# Patient Record
Sex: MALE | Race: WHITE | Employment: OTHER | URBAN - NONMETROPOLITAN AREA
[De-identification: names, ages, dates, MRNs, and addresses within clinical notes are randomized per-mention and may not be internally consistent; named-entity substitution may affect disease eponyms.]

---

## 2018-08-13 ENCOUNTER — TELEPHONE (OUTPATIENT)
Dept: PHYSICAL MEDICINE AND REHAB | Age: 83
End: 2018-08-13

## 2018-08-13 ENCOUNTER — OFFICE VISIT (OUTPATIENT)
Dept: PHYSICAL MEDICINE AND REHAB | Age: 83
End: 2018-08-13
Payer: MEDICARE

## 2018-08-13 VITALS
HEART RATE: 75 BPM | DIASTOLIC BLOOD PRESSURE: 74 MMHG | HEIGHT: 70 IN | SYSTOLIC BLOOD PRESSURE: 114 MMHG | BODY MASS INDEX: 27.77 KG/M2 | WEIGHT: 194 LBS

## 2018-08-13 DIAGNOSIS — G89.4 CHRONIC PAIN SYNDROME: ICD-10-CM

## 2018-08-13 DIAGNOSIS — M48.061 LUMBAR STENOSIS WITHOUT NEUROGENIC CLAUDICATION: ICD-10-CM

## 2018-08-13 DIAGNOSIS — M47.816 SPONDYLOSIS OF LUMBAR REGION WITHOUT MYELOPATHY OR RADICULOPATHY: Primary | ICD-10-CM

## 2018-08-13 DIAGNOSIS — M47.816 LUMBAR FACET ARTHROPATHY: ICD-10-CM

## 2018-08-13 DIAGNOSIS — M51.36 LUMBAR DEGENERATIVE DISC DISEASE: ICD-10-CM

## 2018-08-13 PROCEDURE — 4040F PNEUMOC VAC/ADMIN/RCVD: CPT | Performed by: NURSE PRACTITIONER

## 2018-08-13 PROCEDURE — 1101F PT FALLS ASSESS-DOCD LE1/YR: CPT | Performed by: NURSE PRACTITIONER

## 2018-08-13 PROCEDURE — 99205 OFFICE O/P NEW HI 60 MIN: CPT | Performed by: NURSE PRACTITIONER

## 2018-08-13 PROCEDURE — 1036F TOBACCO NON-USER: CPT | Performed by: NURSE PRACTITIONER

## 2018-08-13 PROCEDURE — G8419 CALC BMI OUT NRM PARAM NOF/U: HCPCS | Performed by: NURSE PRACTITIONER

## 2018-08-13 PROCEDURE — 1123F ACP DISCUSS/DSCN MKR DOCD: CPT | Performed by: NURSE PRACTITIONER

## 2018-08-13 PROCEDURE — G8427 DOCREV CUR MEDS BY ELIG CLIN: HCPCS | Performed by: NURSE PRACTITIONER

## 2018-08-13 RX ORDER — APIXABAN 5 MG/1
TABLET, FILM COATED ORAL
Status: ON HOLD | COMMUNITY
End: 2018-12-02 | Stop reason: HOSPADM

## 2018-08-13 RX ORDER — AMOXICILLIN AND CLAVULANATE POTASSIUM 875; 125 MG/1; MG/1
TABLET, FILM COATED ORAL
Status: ON HOLD | COMMUNITY
End: 2018-08-23 | Stop reason: ALTCHOICE

## 2018-08-13 RX ORDER — ATORVASTATIN CALCIUM 40 MG/1
TABLET, FILM COATED ORAL
COMMUNITY

## 2018-08-13 RX ORDER — LISINOPRIL 10 MG/1
TABLET ORAL
COMMUNITY

## 2018-08-13 RX ORDER — DILTIAZEM HYDROCHLORIDE 60 MG/1
TABLET, FILM COATED ORAL
Status: ON HOLD | COMMUNITY
End: 2018-12-02 | Stop reason: HOSPADM

## 2018-08-13 RX ORDER — WARFARIN SODIUM 7.5 MG/1
TABLET ORAL
Status: ON HOLD | COMMUNITY
End: 2018-08-23 | Stop reason: ALTCHOICE

## 2018-08-13 RX ORDER — BICALUTAMIDE 50 MG/1
50 TABLET, FILM COATED ORAL
COMMUNITY
Start: 2018-05-16

## 2018-08-13 ASSESSMENT — ENCOUNTER SYMPTOMS
SHORTNESS OF BREATH: 1
GASTROINTESTINAL NEGATIVE: 1
BACK PAIN: 1
ALLERGIC/IMMUNOLOGIC NEGATIVE: 1

## 2018-08-13 NOTE — PROGRESS NOTES
[facility]  [address] [facility].  Dept: [phone]  Dept Fax: [phone]: [phone]    Visit Date: 8/13/2018    Radha Hernandes is a 80 y.o. male who is referred for pain management evaluation and treatment per Dr. Gerhardt Daniels. CAGE and [employer]AGE[employer] Questions   1. In the last three months, have you felt you should cut down or stop drinking or using drugs? Yes []        No [x]     2. In the last three months, has anyone annoyed you or gotten on your nerves by telling you to cut down or stop drinking or using drugs? Yes []        No [x]     3. In the last three months, have you felt guilty or bad about how much you drink or use drugs? Yes []        No [x]     4. In the last three months, have you been waking up wanting to have an alcoholic drink or use drugs? Yes []        No [x]        Opioid Risk Tool:  Clinician Form       1. Family History of Substance Abuse: Female Male    Alcohol   []1   []3    Illegal drugs   []2   []3    Prescription drugs     []4   []4   2. Personal History of Substance Abuse:          Alcohol   []3   []3    Illegal drugs   []4   []4    Prescription drugs     []5   []5   3. Age (jostin box if between 12 and 39):     []1   []1   4. History of Preadolescent Sexual Abuse:     []3   []0   5. Psychological Disease:      Attention deficit disorder, obsessive-compulsive disorder, bipolar, schizophrenia   []2   []2      Depression     []1   []1    Scoring Totals       Total Score  Low Risk  Moderate Risk  High Risk   Risk Category   0 - 3   4 - 7   8 or Above      Patient states symptoms interfere with:  A.  General Activity:  yes   B. Mood: no    C. Walking Ability:   yes   D. Normal Work (Includes both work outside the home and housework):   yes    E.  Relations with Other People:  no   F. Sleep:   yes   G.  Enjoyment of Life:  no       HPI:     Chief Complaint: Lower back pain      HPI  Lower back pain present for the past 5 years, denies no injury or trauma. Pin has gradually increased in the past 5 years. Pain is located in lower back and radiates across lower back. Denies any radicular pain or radiation to other areas. Pain is currently 3/10 while sitting. Increases up to 8-9/10 aggravated bending, chores, lifting, turning. Pain decreases to as low as 2/10 with sitting and resting. Pain is described as achy pain with sharp pains with activity. Pain is worse in the morning with stiffness. Pain interferes with activity, chores, social life. Able to tolerate 30 minutes of activity before he needs a break. Denies any bowel or bladder changes or loss of function. Patient remains very active. Has tried therapy and continues home exercises which helps, Ibuprofen has helped but not a candidate d/t being on blood thinners. Not a candidate for back surgery was seen and evaluated by [facility] and referred here. L-MRI reviewed     The patient has No Known Allergies. Past Medical History  Alek Murillo  has no past medical history on file. Past Surgical History  The patient  has no past surgical history on file. Family History  This patient's family history is not on file. Social History  Alek Murillo  reports that he has never smoked. He has never used smokeless tobacco. He reports that he does not drink alcohol or use drugs.     Medications    Current Outpatient Prescriptions:     Multiple Vitamins-Minerals (MULTIVITAMIN ADULT PO), Take by mouth, Disp: , Rfl:     apixaban (ELIQUIS) 5 MG TABS tablet, Eliquis 5 mg tablet, Disp: , Rfl:     Mirabegron ER (MYRBETRIQ) 25 MG TB24, Myrbetriq 25 mg tablet,extended release, Disp: , Rfl:     atorvastatin (LIPITOR) 40 MG tablet, atorvastatin 40 mg tablet, Disp: , Rfl:     amoxicillin-clavulanate (AUGMENTIN) 875-125 MG per tablet, amoxicillin 875 mg-potassium clavulanate 125 mg tablet  Take 1 tablet every 12 hours by oral route for 7 2+ on the right side and 2+ on the left side. Brachioradialis reflexes are 2+ on the right side and 2+ on the left side. Patellar reflexes are 2+ on the right side and 2+ on the left side. Achilles reflexes are 2+ on the right side and 2+ on the left side. SLR negative 90 degrees   Skin: Skin is warm and dry. He is not diaphoretic. Psychiatric: He has a normal mood and affect. EFRA test: negative   Yeoman's test: negative   Gaenslen test: negative      Assessment:     1. Spondylosis of lumbar region without myelopathy or radiculopathy    2. Lumbar facet arthropathy (HCC)    3. Lumbar degenerative disc disease    4. Lumbar stenosis without neurogenic claudication    5. Chronic pain syndrome            Plan:      · Patient read and signed orientation and opioid agreement. · OARRS reviewed. · Discussed long term side effects of medications. · Discussed tolerance, dependency and addiction. · Patient told can not receive any pain medications from any other source. · Discussed with patient they may not be pain free. · No evidence of abuse, diversion or aberrant behavior. · Medications and/or procedures improve function and quality of life. · Reviewed [facility] notes in detail, Dr. Mina Frames notes   · Reviewed L-MRI in detail. · Plan Bilateral L-facet MBB @ L3-4, L4-5, L5-S1 for diagnostic and therapeutic relief. Procedure and risks discussed in detail. · Will need to be cleared to be off blood thinners for 5 days prior to procedure. · Patient is not interested in medications for pain    Previous Treatments tried:  · PT: Yes,  any benefit? Yes, how many weeks? 1 year ago. Continues home exercise program   · NSAIDs: Yes,  any benefit? Yes,  not a candidate d/t blood thinners   · Chiropractic: No  · Muscle relaxants: No  · Narcotics: no  · Spine surgeon consult: Yes, not a candidate for surgery   · Any Implants: No    Meds.  Prescribed:   No orders of the defined types were placed in this encounter. Return for Bilateral L-facet MBB @ L3-4, L4-5, L5-S1. , follow up after procedure. Time spent with patient was 60 minutes more than 50% was spent  Counseling/coordinated the patient's care.     Electronically signed by KAREEM Rey CNP on 8/13/2018 at 1:57 PM

## 2018-08-16 NOTE — PROGRESS NOTES
PAT call attempted patient unavailable left message with instructions    NPO after midnight  Bring insurance info and drivers license  Wear comfortable clean clothing  Do not bring jewelry   Shower night before and morning of surgery with a liquid antibacterial soap  Bring list of medications with dosage and how often taken  Follow all instructions given by your physician   needed at discharge  Call [phone] for any questions

## 2018-08-23 ENCOUNTER — APPOINTMENT (OUTPATIENT)
Dept: GENERAL RADIOLOGY | Age: 83
End: 2018-08-23
Attending: PAIN MEDICINE
Payer: MEDICARE

## 2018-08-23 ENCOUNTER — HOSPITAL ENCOUNTER (OUTPATIENT)
Age: 83
Setting detail: OUTPATIENT SURGERY
Discharge: HOME | End: 2018-08-23
Attending: PAIN MEDICINE | Admitting: PAIN MEDICINE
Payer: MEDICARE

## 2018-08-23 ENCOUNTER — ANESTHESIA (OUTPATIENT)
Dept: OPERATING ROOM | Age: 83
End: 2018-08-23
Payer: MEDICARE

## 2018-08-23 ENCOUNTER — ANESTHESIA EVENT (OUTPATIENT)
Dept: OPERATING ROOM | Age: 83
End: 2018-08-23
Payer: MEDICARE

## 2018-08-23 VITALS
OXYGEN SATURATION: 98 % | RESPIRATION RATE: 7 BRPM | DIASTOLIC BLOOD PRESSURE: 73 MMHG | SYSTOLIC BLOOD PRESSURE: 156 MMHG

## 2018-08-23 VITALS
HEIGHT: 70 IN | DIASTOLIC BLOOD PRESSURE: 77 MMHG | TEMPERATURE: 97.6 F | HEART RATE: 94 BPM | BODY MASS INDEX: 26.92 KG/M2 | RESPIRATION RATE: 20 BRPM | SYSTOLIC BLOOD PRESSURE: 151 MMHG | OXYGEN SATURATION: 95 % | WEIGHT: 188 LBS

## 2018-08-23 PROCEDURE — 64493 INJ PARAVERT F JNT L/S 1 LEV: CPT | Performed by: PAIN MEDICINE

## 2018-08-23 PROCEDURE — 3600000058 HC PAIN LEVEL 5 BASE: Performed by: PAIN MEDICINE

## 2018-08-23 PROCEDURE — 7100000010 HC PHASE II RECOVERY - FIRST 15 MIN: Performed by: PAIN MEDICINE

## 2018-08-23 PROCEDURE — 2709999900 HC NON-CHARGEABLE SUPPLY: Performed by: PAIN MEDICINE

## 2018-08-23 PROCEDURE — 6360000002 HC RX W HCPCS: Performed by: PAIN MEDICINE

## 2018-08-23 PROCEDURE — 3700000000 HC ANESTHESIA ATTENDED CARE: Performed by: PAIN MEDICINE

## 2018-08-23 PROCEDURE — 6360000002 HC RX W HCPCS: Performed by: NURSE ANESTHETIST, CERTIFIED REGISTERED

## 2018-08-23 PROCEDURE — 64494 INJ PARAVERT F JNT L/S 2 LEV: CPT | Performed by: PAIN MEDICINE

## 2018-08-23 PROCEDURE — 3209999900 FLUORO FOR SURGICAL PROCEDURES

## 2018-08-23 PROCEDURE — 2500000003 HC RX 250 WO HCPCS: Performed by: PAIN MEDICINE

## 2018-08-23 PROCEDURE — 7100000011 HC PHASE II RECOVERY - ADDTL 15 MIN: Performed by: PAIN MEDICINE

## 2018-08-23 PROCEDURE — 64495 INJ PARAVERT F JNT L/S 3 LEV: CPT | Performed by: PAIN MEDICINE

## 2018-08-23 RX ORDER — OMEGA-3 FATTY ACIDS/FISH OIL 300-1000MG
1 CAPSULE ORAL 2 TIMES DAILY PRN
Status: ON HOLD | COMMUNITY
End: 2018-12-02 | Stop reason: HOSPADM

## 2018-08-23 RX ORDER — BETAMETHASONE SODIUM PHOSPHATE AND BETAMETHASONE ACETATE 3; 3 MG/ML; MG/ML
INJECTION, SUSPENSION INTRA-ARTICULAR; INTRALESIONAL; INTRAMUSCULAR; SOFT TISSUE PRN
Status: DISCONTINUED | OUTPATIENT
Start: 2018-08-23 | End: 2018-08-23 | Stop reason: HOSPADM

## 2018-08-23 RX ORDER — PROPOFOL 10 MG/ML
INJECTION, EMULSION INTRAVENOUS PRN
Status: DISCONTINUED | OUTPATIENT
Start: 2018-08-23 | End: 2018-08-23 | Stop reason: SDUPTHER

## 2018-08-23 RX ORDER — BUPIVACAINE HYDROCHLORIDE 2.5 MG/ML
INJECTION, SOLUTION EPIDURAL; INFILTRATION; INTRACAUDAL PRN
Status: DISCONTINUED | OUTPATIENT
Start: 2018-08-23 | End: 2018-08-23 | Stop reason: HOSPADM

## 2018-08-23 RX ORDER — LIDOCAINE HYDROCHLORIDE 10 MG/ML
INJECTION, SOLUTION INFILTRATION; PERINEURAL PRN
Status: DISCONTINUED | OUTPATIENT
Start: 2018-08-23 | End: 2018-08-23 | Stop reason: HOSPADM

## 2018-08-23 RX ADMIN — PROPOFOL 40 MG: 10 INJECTION, EMULSION INTRAVENOUS at 09:58

## 2018-08-23 ASSESSMENT — PULMONARY FUNCTION TESTS
PIF_VALUE: 1

## 2018-08-23 ASSESSMENT — PAIN - FUNCTIONAL ASSESSMENT: PAIN_FUNCTIONAL_ASSESSMENT: 0-10

## 2018-08-23 ASSESSMENT — LIFESTYLE VARIABLES: SMOKING_STATUS: 0

## 2018-08-23 ASSESSMENT — PAIN SCALES - GENERAL: PAINLEVEL_OUTOF10: 0

## 2018-08-23 NOTE — PROGRESS NOTES
1006:  Pt arrival to phase II recovery, VSS, POx 95% RA. Spontaneous respirations noted. Pt denies pain/nausea. Pt given drink/snack,  not here yet. Pt resting, call light in reach. 1035:  Pt ride arrives from [place] service. Pt & , Wai Aden, educated on d/c instructions, both verbalize understanding. sylvester Saab, taking pt home to wife. 1037:  Pt escorted to passenger side of car,  Wai Aden driving him home. Pt d/c in stable condition to sylvester Saab, who is taking him home to wife & assuming care for him until that time.

## 2018-08-23 NOTE — H&P
[address] [address]  History and Physical Update    Pt Name: Radha Johnson  MRN: [id number]  YOB: 1927  Date of evaluation: 8/23/2018      I have examined the patient and reviewed the H&P/Consult and there are no changes to the patient or plans.         Electronically signed by Michaela Ozuna MD on 8/23/2018 at 9:32 AM

## 2018-08-23 NOTE — ANESTHESIA POSTPROCEDURE EVALUATION
Department of Anesthesiology  Postprocedure Note    Patient: Jermain Ch  MRN: [id number]  YOB: 1927  Date of evaluation: 8/23/2018  Time:  11:42 AM     Procedure Summary     Date:  08/23/18 Room / Location:  [facility] OR 03 / [address] [facility]    Anesthesia Start:  8833 Anesthesia Stop:  1006    Procedure:  LUMBAR FACET Bilateral L-facet MBB @ L3-4, L4-5, L5-S1. (Bilateral Back) Diagnosis:  (lumbar spondylosis)    Surgeon:  Landon Hester MD Responsible Provider:  Fabi Dominguez MD    Anesthesia Type:  MAC ASA Status:  3          Anesthesia Type: No value filed. Thierno Phase I:      Thierno Phase II: Thierno Score: 10    Last vitals: Reviewed and per EMR flowsheets.        Anesthesia Post Evaluation    Patient location during evaluation: PACU  Patient participation: complete - patient participated  Level of consciousness: awake  Nausea & Vomiting: no nausea  Complications: no  Cardiovascular status: hemodynamically stable  Respiratory status: acceptable

## 2018-08-23 NOTE — ANESTHESIA PRE PROCEDURE
Department of Anesthesiology  Preprocedure Note       Name:  Vickie Williamson   Age:  80 y.o.  :  1927                                          MRN:  [id number]         Date:  2018      Surgeon: Ronny Acharya):  Pk Kong MD    Procedure: Procedure(s):  LUMBAR FACET Bilateral L-facet MBB @ L3-4, L4-5, L5-S1. Medications prior to admission:   Prior to Admission medications    Medication Sig Start Date End Date Taking? Authorizing Provider   ibuprofen (ADVIL) 200 MG CAPS Take 1 capsule by mouth 2 times daily as needed for Fever   Yes Historical Provider, MD   Omega-3 Fatty Acids (OMEGA-3 PLUS PO) Take 1 tablet by mouth daily   Yes Historical Provider, MD   MELATONIN PO Take 1 tablet by mouth nightly   Yes Historical Provider, MD   NONFORMULARY    Yes Historical Provider, MD   Multiple Vitamins-Minerals (MULTIVITAMIN ADULT PO) Take by mouth   Yes Historical Provider, MD   apixaban (ELIQUIS) 5 MG TABS tablet Eliquis 5 mg tablet   Yes Historical Provider, MD   Mirabegron ER (MYRBETRIQ) 25 MG TB24 Myrbetriq 25 mg tablet,extended release   Yes Historical Provider, MD   atorvastatin (LIPITOR) 40 MG tablet atorvastatin 40 mg tablet   Yes Historical Provider, MD   bicalutamide (CASODEX) 50 MG chemo tablet Take 50 mg by mouth 18  Yes Historical Provider, MD   diltiazem (CARDIZEM) 60 MG tablet diltiazem 60 mg tablet   Yes Historical Provider, MD   lisinopril (PRINIVIL;ZESTRIL) 10 MG tablet lisinopril 10 mg tablet    1 tablet every day by oral route. Yes Historical Provider, MD       Current medications:    No current facility-administered medications for this encounter. Allergies:  No Known Allergies    Problem List:  There is no problem list on file for this patient.       Past Medical History:        Diagnosis Date    Arthritis     CAD (coronary artery disease)     Cancer ([facility] [address]     PROSTATE    Hypertension     Irregular heart beat        Past Surgical History:        Procedure

## 2018-10-09 ENCOUNTER — OFFICE VISIT (OUTPATIENT)
Dept: PHYSICAL MEDICINE AND REHAB | Age: 83
End: 2018-10-09
Payer: MEDICARE

## 2018-10-09 VITALS
BODY MASS INDEX: 26.48 KG/M2 | WEIGHT: 185 LBS | DIASTOLIC BLOOD PRESSURE: 66 MMHG | HEART RATE: 71 BPM | SYSTOLIC BLOOD PRESSURE: 119 MMHG | HEIGHT: 70 IN

## 2018-10-09 DIAGNOSIS — G89.4 CHRONIC PAIN SYNDROME: ICD-10-CM

## 2018-10-09 DIAGNOSIS — M47.816 LUMBAR SPONDYLOSIS: Primary | ICD-10-CM

## 2018-10-09 PROCEDURE — 1036F TOBACCO NON-USER: CPT | Performed by: NURSE PRACTITIONER

## 2018-10-09 PROCEDURE — G8419 CALC BMI OUT NRM PARAM NOF/U: HCPCS | Performed by: NURSE PRACTITIONER

## 2018-10-09 PROCEDURE — G8427 DOCREV CUR MEDS BY ELIG CLIN: HCPCS | Performed by: NURSE PRACTITIONER

## 2018-10-09 PROCEDURE — 99213 OFFICE O/P EST LOW 20 MIN: CPT | Performed by: NURSE PRACTITIONER

## 2018-10-09 PROCEDURE — 1123F ACP DISCUSS/DSCN MKR DOCD: CPT | Performed by: NURSE PRACTITIONER

## 2018-10-09 PROCEDURE — G8484 FLU IMMUNIZE NO ADMIN: HCPCS | Performed by: NURSE PRACTITIONER

## 2018-10-09 PROCEDURE — 4040F PNEUMOC VAC/ADMIN/RCVD: CPT | Performed by: NURSE PRACTITIONER

## 2018-10-09 PROCEDURE — 1101F PT FALLS ASSESS-DOCD LE1/YR: CPT | Performed by: NURSE PRACTITIONER

## 2018-10-09 ASSESSMENT — ENCOUNTER SYMPTOMS
SHORTNESS OF BREATH: 0
RHINORRHEA: 0
EYE PAIN: 0
COLOR CHANGE: 0
CHEST TIGHTNESS: 0
DIARRHEA: 0
SINUS PRESSURE: 0
VOMITING: 0
ABDOMINAL PAIN: 0
WHEEZING: 0
BACK PAIN: 1
NAUSEA: 0
COUGH: 0
CONSTIPATION: 0
PHOTOPHOBIA: 0
SORE THROAT: 0

## 2018-10-09 NOTE — PROGRESS NOTES
[facility] [address]  [address] [facility].  Dept: [phone]  Dept Fax: [phone]: [phone]    Visit Date: 10/9/2018    Functionality Assessment/Goals Worksheet     On a scale of 0 (Does not Interfere) to 10 (Completely Interferes)     1. Which number describes how during the past week pain has interfered with       the following:  A. General Activity:  8  B. Mood: 10  C. Walking Ability:  7  D. Normal Work (Includes both work outside the home and housework):  7  E. Relations with Other People:   10  F. Sleep:   9  G. Enjoyment of Life:   10    2. Patient Prefers to Take their Pain Medications:     []  On a regular basis   [x]  Only when necessary    []  Does not take pain medications    3. What are the Patient's Goals/Expectations for Visiting Pain Management? [x]  Learn about my pain    [x]  Receive Medication   [x]  Physical Therapy     []  Treat Depression   [x]  Receive Injections    []  Treat Sleep   []  Deal with Anxiety and Stress   []  Treat Opoid Dependence/Addiction   [x]  Other: \"I have quality of life\"    Questions: \"Future visits and what treatments are to come\"    HPI:   Jefry Downing is a 80 y.o. male is here today for    Chief Complaint: Low back pain    HPI   F/U Lumbar Facet MBB #1 @ L3-4,4-5,5-S1 Bilateral on 8/23/2018 states he received 80% pain relief or benefit for 1 full week. He went to [place] and walked miles and pulled weeds at his home. He felt so great he states. He takes Aleve for pain. He continues to have low back pain and increases with standing to do dishes or mowing the grass. He denies any ER visits since last visit. Pain scale with out pain medications is 610. Pain scale with pain medications is  1/10. The patient has No Known Allergies.     Past Medical History  Eva Bethea  has a past medical history of Arthritis; CAD (coronary artery disease); and wheezing. Cardiovascular: Negative for chest pain and palpitations. Gastrointestinal: Negative for abdominal pain, constipation, diarrhea, nausea and vomiting. Endocrine: Negative for cold intolerance, heat intolerance, polydipsia, polyphagia and polyuria. Genitourinary: Negative for decreased urine volume, difficulty urinating, frequency and hematuria. Musculoskeletal: Positive for arthralgias, back pain, gait problem and myalgias. Negative for joint swelling, neck pain and neck stiffness. Skin: Negative for color change and rash. Allergic/Immunologic: Negative for food allergies and immunocompromised state. Neurological: Negative for dizziness, tremors, seizures, syncope, facial asymmetry, speech difficulty, weakness, light-headedness, numbness and headaches. Hematological: Does not bruise/bleed easily. Psychiatric/Behavioral: Negative for agitation, behavioral problems, confusion, decreased concentration, dysphoric mood, hallucinations, self-injury, sleep disturbance and suicidal ideas. The patient is not nervous/anxious and is not hyperactive. Objective:     Vitals:    10/09/18 1117   BP: 119/66   Site: Left Upper Arm   Position: Sitting   Cuff Size: Medium Adult   Pulse: 71   Weight: 185 lb (83.9 kg)   Height: 5' 10\" (1.778 m)       Physical Exam   Constitutional: He is oriented to person, place, and time. He appears well-developed and well-nourished. No distress. HENT:   Head: Normocephalic and atraumatic. Right Ear: External ear normal.   Left Ear: External ear normal.   Nose: Nose normal.   Mouth/Throat: Oropharynx is clear and moist. No oropharyngeal exudate. Eyes: Pupils are equal, round, and reactive to light. Conjunctivae and EOM are normal. Right eye exhibits no discharge. Left eye exhibits no discharge. No scleral icterus. Neck: Normal range of motion and full passive range of motion without pain. Neck supple. No muscular tenderness present. No neck rigidity.  No edema, no erythema and normal range of motion present. No thyromegaly present. Cardiovascular: Normal rate, regular rhythm, normal heart sounds and intact distal pulses. Exam reveals no gallop and no friction rub. No murmur heard. Pulmonary/Chest: Effort normal and breath sounds normal. No respiratory distress. He has no wheezes. He has no rales. He exhibits no tenderness. Abdominal: Soft. Bowel sounds are normal. He exhibits no distension. There is no tenderness. There is no rebound and no guarding. Musculoskeletal: He exhibits tenderness. Lumbar back: He exhibits decreased range of motion, tenderness, bony tenderness, pain and spasm. Neurological: He is alert and oriented to person, place, and time. He has normal strength. He is not disoriented. He displays no atrophy. No cranial nerve deficit or sensory deficit. He exhibits normal muscle tone. He displays a negative Romberg sign. Coordination and gait normal. He displays no Babinski's sign on the right side. Skin: Skin is warm. No rash noted. He is not diaphoretic. No erythema. No pallor. Psychiatric: He has a normal mood and affect. His behavior is normal. Judgment and thought content normal. His mood appears not anxious. His affect is not angry, not blunt, not labile and not inappropriate. His speech is not rapid and/or pressured, not delayed, not tangential and not slurred. He is not agitated, not aggressive, not hyperactive, not slowed, not withdrawn, not actively hallucinating and not combative. Thought content is not paranoid and not delusional. Cognition and memory are not impaired. He does not express impulsivity or inappropriate judgment. He does not exhibit a depressed mood. He expresses no homicidal and no suicidal ideation. He expresses no suicidal plans and no homicidal plans. He is communicative. He exhibits normal recent memory and normal remote memory. He is attentive. Nursing note and vitals reviewed.     EFRA

## 2018-10-22 ENCOUNTER — APPOINTMENT (OUTPATIENT)
Dept: GENERAL RADIOLOGY | Age: 83
End: 2018-10-22
Attending: PAIN MEDICINE
Payer: MEDICARE

## 2018-10-22 ENCOUNTER — ANESTHESIA EVENT (OUTPATIENT)
Dept: OPERATING ROOM | Age: 83
End: 2018-10-22
Payer: MEDICARE

## 2018-10-22 ENCOUNTER — HOSPITAL ENCOUNTER (OUTPATIENT)
Age: 83
Setting detail: OUTPATIENT SURGERY
Discharge: HOME | End: 2018-10-22
Attending: PAIN MEDICINE | Admitting: PAIN MEDICINE
Payer: MEDICARE

## 2018-10-22 ENCOUNTER — ANESTHESIA (OUTPATIENT)
Dept: OPERATING ROOM | Age: 83
End: 2018-10-22
Payer: MEDICARE

## 2018-10-22 VITALS
SYSTOLIC BLOOD PRESSURE: 158 MMHG | BODY MASS INDEX: 27.49 KG/M2 | RESPIRATION RATE: 16 BRPM | HEIGHT: 70 IN | WEIGHT: 192 LBS | OXYGEN SATURATION: 96 % | TEMPERATURE: 97.3 F | HEART RATE: 66 BPM | DIASTOLIC BLOOD PRESSURE: 77 MMHG

## 2018-10-22 PROCEDURE — 6360000002 HC RX W HCPCS: Performed by: PAIN MEDICINE

## 2018-10-22 PROCEDURE — 3600000058 HC PAIN LEVEL 5 BASE: Performed by: PAIN MEDICINE

## 2018-10-22 PROCEDURE — 64493 INJ PARAVERT F JNT L/S 1 LEV: CPT | Performed by: PAIN MEDICINE

## 2018-10-22 PROCEDURE — 2500000003 HC RX 250 WO HCPCS: Performed by: PAIN MEDICINE

## 2018-10-22 PROCEDURE — 3209999900 FLUORO FOR SURGICAL PROCEDURES

## 2018-10-22 PROCEDURE — 64495 INJ PARAVERT F JNT L/S 3 LEV: CPT | Performed by: PAIN MEDICINE

## 2018-10-22 PROCEDURE — 7100000010 HC PHASE II RECOVERY - FIRST 15 MIN: Performed by: PAIN MEDICINE

## 2018-10-22 PROCEDURE — 64494 INJ PARAVERT F JNT L/S 2 LEV: CPT | Performed by: PAIN MEDICINE

## 2018-10-22 PROCEDURE — 7100000011 HC PHASE II RECOVERY - ADDTL 15 MIN: Performed by: PAIN MEDICINE

## 2018-10-22 RX ORDER — BETAMETHASONE SODIUM PHOSPHATE AND BETAMETHASONE ACETATE 3; 3 MG/ML; MG/ML
INJECTION, SUSPENSION INTRA-ARTICULAR; INTRALESIONAL; INTRAMUSCULAR; SOFT TISSUE PRN
Status: DISCONTINUED | OUTPATIENT
Start: 2018-10-22 | End: 2018-10-22 | Stop reason: HOSPADM

## 2018-10-22 RX ORDER — BUPIVACAINE HYDROCHLORIDE 2.5 MG/ML
INJECTION, SOLUTION EPIDURAL; INFILTRATION; INTRACAUDAL PRN
Status: DISCONTINUED | OUTPATIENT
Start: 2018-10-22 | End: 2018-10-22 | Stop reason: HOSPADM

## 2018-10-22 RX ORDER — LIDOCAINE HYDROCHLORIDE 10 MG/ML
INJECTION, SOLUTION INFILTRATION; PERINEURAL PRN
Status: DISCONTINUED | OUTPATIENT
Start: 2018-10-22 | End: 2018-10-22 | Stop reason: HOSPADM

## 2018-10-22 ASSESSMENT — PAIN DESCRIPTION - PAIN TYPE: TYPE: CHRONIC PAIN

## 2018-10-22 ASSESSMENT — PAIN DESCRIPTION - LOCATION: LOCATION: BACK

## 2018-10-22 ASSESSMENT — PAIN DESCRIPTION - ORIENTATION: ORIENTATION: LOWER

## 2018-10-22 ASSESSMENT — PAIN SCALES - GENERAL: PAINLEVEL_OUTOF10: 3

## 2018-10-22 NOTE — H&P
[address] [address]  History and Physical Update    Pt Name: Bertha Liz  MRN: [id number]  YOB: 1927  Date of evaluation: 10/22/2018      I have examined the patient and reviewed the H&P/Consult and there are no changes to the patient or plans.         Electronically signed by Rell Knutson MD on 10/22/2018 at 9:05 AM

## 2018-11-05 ENCOUNTER — OFFICE VISIT (OUTPATIENT)
Dept: PHYSICAL MEDICINE AND REHAB | Age: 83
End: 2018-11-05
Payer: MEDICARE

## 2018-11-05 VITALS
SYSTOLIC BLOOD PRESSURE: 107 MMHG | HEART RATE: 60 BPM | DIASTOLIC BLOOD PRESSURE: 65 MMHG | HEIGHT: 70 IN | BODY MASS INDEX: 27.49 KG/M2 | WEIGHT: 192.02 LBS

## 2018-11-05 DIAGNOSIS — M47.816 LUMBAR SPONDYLOSIS: Primary | ICD-10-CM

## 2018-11-05 DIAGNOSIS — M48.061 LUMBAR STENOSIS WITHOUT NEUROGENIC CLAUDICATION: ICD-10-CM

## 2018-11-05 DIAGNOSIS — M47.816 LUMBAR FACET ARTHROPATHY: ICD-10-CM

## 2018-11-05 DIAGNOSIS — M51.36 LUMBAR DEGENERATIVE DISC DISEASE: ICD-10-CM

## 2018-11-05 DIAGNOSIS — G89.4 CHRONIC PAIN SYNDROME: ICD-10-CM

## 2018-11-05 PROCEDURE — 1036F TOBACCO NON-USER: CPT | Performed by: NURSE PRACTITIONER

## 2018-11-05 PROCEDURE — 1101F PT FALLS ASSESS-DOCD LE1/YR: CPT | Performed by: NURSE PRACTITIONER

## 2018-11-05 PROCEDURE — G8427 DOCREV CUR MEDS BY ELIG CLIN: HCPCS | Performed by: NURSE PRACTITIONER

## 2018-11-05 PROCEDURE — 1123F ACP DISCUSS/DSCN MKR DOCD: CPT | Performed by: NURSE PRACTITIONER

## 2018-11-05 PROCEDURE — G8419 CALC BMI OUT NRM PARAM NOF/U: HCPCS | Performed by: NURSE PRACTITIONER

## 2018-11-05 PROCEDURE — G8484 FLU IMMUNIZE NO ADMIN: HCPCS | Performed by: NURSE PRACTITIONER

## 2018-11-05 PROCEDURE — 4040F PNEUMOC VAC/ADMIN/RCVD: CPT | Performed by: NURSE PRACTITIONER

## 2018-11-05 PROCEDURE — 99213 OFFICE O/P EST LOW 20 MIN: CPT | Performed by: NURSE PRACTITIONER

## 2018-11-05 ASSESSMENT — ENCOUNTER SYMPTOMS
PHOTOPHOBIA: 0
EYE PAIN: 0
COLOR CHANGE: 0
VOMITING: 0
SINUS PRESSURE: 0
NAUSEA: 0
ABDOMINAL PAIN: 0
RHINORRHEA: 0
COUGH: 0
CONSTIPATION: 0
DIARRHEA: 0
BACK PAIN: 1
SHORTNESS OF BREATH: 0
CHEST TIGHTNESS: 0
WHEEZING: 0
SORE THROAT: 0

## 2018-11-05 NOTE — PROGRESS NOTES
palpitations. Gastrointestinal: Negative for abdominal pain, constipation, diarrhea, nausea and vomiting. Endocrine: Negative for cold intolerance, heat intolerance, polydipsia, polyphagia and polyuria. Genitourinary: Negative for decreased urine volume, difficulty urinating, frequency and hematuria. Musculoskeletal: Positive for arthralgias, back pain, gait problem and myalgias. Negative for joint swelling, neck pain and neck stiffness. Skin: Negative for color change and rash. Allergic/Immunologic: Negative for food allergies and immunocompromised state. Neurological: Negative for dizziness, tremors, seizures, syncope, facial asymmetry, speech difficulty, weakness, light-headedness, numbness and headaches. Hematological: Does not bruise/bleed easily. Psychiatric/Behavioral: Negative for agitation, behavioral problems, confusion, decreased concentration, dysphoric mood, hallucinations, self-injury, sleep disturbance and suicidal ideas. The patient is not nervous/anxious and is not hyperactive. Objective:     Vitals:    11/05/18 0906   BP: 107/65   Site: Left Upper Arm   Position: Sitting   Pulse: 60   Weight: 192 lb 0.3 oz (87.1 kg)   Height: 5' 10\" (1.778 m)       Physical Exam   Constitutional: He is oriented to person, place, and time. He appears well-developed and well-nourished. No distress. HENT:   Head: Normocephalic and atraumatic. Right Ear: External ear normal.   Left Ear: External ear normal.   Nose: Nose normal.   Mouth/Throat: Oropharynx is clear and moist. No oropharyngeal exudate. Eyes: Pupils are equal, round, and reactive to light. Conjunctivae and EOM are normal. Right eye exhibits no discharge. Left eye exhibits no discharge. No scleral icterus. Neck: Normal range of motion and full passive range of motion without pain. Neck supple. No muscular tenderness present. No neck rigidity. No edema, no erythema and normal range of motion present. No thyromegaly present.

## 2018-11-28 ENCOUNTER — HOSPITAL ENCOUNTER (INPATIENT)
Age: 83
LOS: 4 days | Discharge: HOME | DRG: 246 | End: 2018-12-02
Attending: INTERNAL MEDICINE | Admitting: FAMILY MEDICINE
Payer: MEDICARE

## 2018-11-28 PROBLEM — E78.5 HYPERLIPIDEMIA: Status: ACTIVE | Noted: 2018-11-28

## 2018-11-28 PROBLEM — I25.10 CAD (CORONARY ARTERY DISEASE): Status: ACTIVE | Noted: 2018-11-28

## 2018-11-28 PROBLEM — R07.9 CHEST PAIN: Status: ACTIVE | Noted: 2018-11-28

## 2018-11-28 PROBLEM — I46.9 CARDIAC ARREST (HCC): Status: ACTIVE | Noted: 2018-11-28

## 2018-11-28 PROBLEM — D72.829 LEUKOCYTOSIS: Status: ACTIVE | Noted: 2018-11-28

## 2018-11-28 LAB
APTT: 30 SECONDS (ref 22–38)
EKG ATRIAL RATE: 72 BPM
EKG P AXIS: 66 DEGREES
EKG P-R INTERVAL: 250 MS
EKG Q-T INTERVAL: 466 MS
EKG QRS DURATION: 152 MS
EKG QTC CALCULATION (BAZETT): 510 MS
EKG R AXIS: 95 DEGREES
EKG T AXIS: -10 DEGREES
EKG VENTRICULAR RATE: 72 BPM
ERYTHROCYTE [DISTWIDTH] IN BLOOD BY AUTOMATED COUNT: 13.7 % (ref 11.5–14.5)
ERYTHROCYTE [DISTWIDTH] IN BLOOD BY AUTOMATED COUNT: 48.5 FL (ref 35–45)
HCT VFR BLD CALC: 32.4 % (ref 42–52)
HEMOGLOBIN: 10.5 GM/DL (ref 14–18)
LACTIC ACID: 1.2 MMOL/L (ref 0.5–2.2)
LIPASE: 18 U/L (ref 5.6–51.3)
MCH RBC QN AUTO: 31.2 PG (ref 26–33)
MCHC RBC AUTO-ENTMCNC: 32.4 GM/DL (ref 32.2–35.5)
MCV RBC AUTO: 96.1 FL (ref 80–94)
MRSA SCREEN RT-PCR: NEGATIVE
PLATELET # BLD: 178 THOU/MM3 (ref 130–400)
PMV BLD AUTO: 9.9 FL (ref 9.4–12.4)
PRO-BNP: 586.1 PG/ML (ref 0–1800)
PROCALCITONIN: 0.14 NG/ML (ref 0.01–0.09)
RBC # BLD: 3.37 MILL/MM3 (ref 4.7–6.1)
TROPONIN T: 0.09 NG/ML
VANCOMYCIN RESISTANT ENTEROCOCCUS: NEGATIVE
WBC # BLD: 9.6 THOU/MM3 (ref 4.8–10.8)

## 2018-11-28 PROCEDURE — 6360000002 HC RX W HCPCS: Performed by: FAMILY MEDICINE

## 2018-11-28 PROCEDURE — 6370000000 HC RX 637 (ALT 250 FOR IP): Performed by: FAMILY MEDICINE

## 2018-11-28 PROCEDURE — 84145 PROCALCITONIN (PCT): CPT

## 2018-11-28 PROCEDURE — 2709999900 HC NON-CHARGEABLE SUPPLY

## 2018-11-28 PROCEDURE — 85027 COMPLETE CBC AUTOMATED: CPT

## 2018-11-28 PROCEDURE — 87500 VANOMYCIN DNA AMP PROBE: CPT

## 2018-11-28 PROCEDURE — 2000000000 HC ICU R&B

## 2018-11-28 PROCEDURE — 2500000003 HC RX 250 WO HCPCS: Performed by: FAMILY MEDICINE

## 2018-11-28 PROCEDURE — 87641 MR-STAPH DNA AMP PROBE: CPT

## 2018-11-28 PROCEDURE — 93005 ELECTROCARDIOGRAM TRACING: CPT | Performed by: FAMILY MEDICINE

## 2018-11-28 PROCEDURE — 87086 URINE CULTURE/COLONY COUNT: CPT

## 2018-11-28 PROCEDURE — 87040 BLOOD CULTURE FOR BACTERIA: CPT

## 2018-11-28 PROCEDURE — 84484 ASSAY OF TROPONIN QUANT: CPT

## 2018-11-28 PROCEDURE — 87081 CULTURE SCREEN ONLY: CPT

## 2018-11-28 PROCEDURE — 83690 ASSAY OF LIPASE: CPT

## 2018-11-28 PROCEDURE — 2580000003 HC RX 258: Performed by: FAMILY MEDICINE

## 2018-11-28 PROCEDURE — 36415 COLL VENOUS BLD VENIPUNCTURE: CPT

## 2018-11-28 PROCEDURE — 83605 ASSAY OF LACTIC ACID: CPT

## 2018-11-28 PROCEDURE — 85730 THROMBOPLASTIN TIME PARTIAL: CPT

## 2018-11-28 PROCEDURE — 83880 ASSAY OF NATRIURETIC PEPTIDE: CPT

## 2018-11-28 PROCEDURE — 99223 1ST HOSP IP/OBS HIGH 75: CPT | Performed by: FAMILY MEDICINE

## 2018-11-28 RX ORDER — SODIUM CHLORIDE 0.9 % (FLUSH) 0.9 %
10 SYRINGE (ML) INJECTION EVERY 12 HOURS SCHEDULED
Status: DISCONTINUED | OUTPATIENT
Start: 2018-11-28 | End: 2018-11-29 | Stop reason: SDUPTHER

## 2018-11-28 RX ORDER — DILTIAZEM HYDROCHLORIDE 60 MG/1
60 TABLET, FILM COATED ORAL EVERY 12 HOURS SCHEDULED
Status: DISCONTINUED | OUTPATIENT
Start: 2018-11-28 | End: 2018-11-30

## 2018-11-28 RX ORDER — ACETAMINOPHEN 325 MG/1
650 TABLET ORAL EVERY 4 HOURS PRN
Status: DISCONTINUED | OUTPATIENT
Start: 2018-11-28 | End: 2018-11-29 | Stop reason: SDUPTHER

## 2018-11-28 RX ORDER — ATORVASTATIN CALCIUM 40 MG/1
40 TABLET, FILM COATED ORAL NIGHTLY
Status: DISCONTINUED | OUTPATIENT
Start: 2018-11-28 | End: 2018-12-02 | Stop reason: HOSPADM

## 2018-11-28 RX ORDER — ASPIRIN 81 MG/1
81 TABLET, CHEWABLE ORAL DAILY
Status: DISCONTINUED | OUTPATIENT
Start: 2018-11-28 | End: 2018-12-02 | Stop reason: HOSPADM

## 2018-11-28 RX ORDER — LISINOPRIL 10 MG/1
10 TABLET ORAL DAILY
Status: DISCONTINUED | OUTPATIENT
Start: 2018-11-29 | End: 2018-12-02 | Stop reason: HOSPADM

## 2018-11-28 RX ORDER — NITROGLYCERIN 20 MG/100ML
5 INJECTION INTRAVENOUS CONTINUOUS
Status: DISCONTINUED | OUTPATIENT
Start: 2018-11-28 | End: 2018-11-30

## 2018-11-28 RX ORDER — HEPARIN SODIUM 10000 [USP'U]/100ML
18 INJECTION, SOLUTION INTRAVENOUS CONTINUOUS
Status: DISCONTINUED | OUTPATIENT
Start: 2018-11-28 | End: 2018-11-30

## 2018-11-28 RX ORDER — HEPARIN SODIUM 1000 [USP'U]/ML
40 INJECTION, SOLUTION INTRAVENOUS; SUBCUTANEOUS PRN
Status: DISCONTINUED | OUTPATIENT
Start: 2018-11-28 | End: 2018-11-30 | Stop reason: ALTCHOICE

## 2018-11-28 RX ORDER — HEPARIN SODIUM 1000 [USP'U]/ML
80 INJECTION, SOLUTION INTRAVENOUS; SUBCUTANEOUS PRN
Status: DISCONTINUED | OUTPATIENT
Start: 2018-11-28 | End: 2018-11-30 | Stop reason: ALTCHOICE

## 2018-11-28 RX ORDER — HEPARIN SODIUM 1000 [USP'U]/ML
80 INJECTION, SOLUTION INTRAVENOUS; SUBCUTANEOUS ONCE
Status: COMPLETED | OUTPATIENT
Start: 2018-11-29 | End: 2018-11-28

## 2018-11-28 RX ORDER — SODIUM CHLORIDE 0.9 % (FLUSH) 0.9 %
10 SYRINGE (ML) INJECTION PRN
Status: DISCONTINUED | OUTPATIENT
Start: 2018-11-28 | End: 2018-11-29 | Stop reason: SDUPTHER

## 2018-11-28 RX ORDER — ONDANSETRON 2 MG/ML
4 INJECTION INTRAMUSCULAR; INTRAVENOUS EVERY 6 HOURS PRN
Status: DISCONTINUED | OUTPATIENT
Start: 2018-11-28 | End: 2018-12-02 | Stop reason: HOSPADM

## 2018-11-28 RX ORDER — LANOLIN ALCOHOL/MO/W.PET/CERES
3 CREAM (GRAM) TOPICAL NIGHTLY PRN
Status: DISCONTINUED | OUTPATIENT
Start: 2018-11-28 | End: 2018-12-02 | Stop reason: HOSPADM

## 2018-11-28 RX ADMIN — HEPARIN SODIUM 18 UNITS/KG/HR: 10000 INJECTION, SOLUTION INTRAVENOUS at 23:25

## 2018-11-28 RX ADMIN — HEPARIN SODIUM 6850 UNITS: 1000 INJECTION INTRAVENOUS; SUBCUTANEOUS at 23:25

## 2018-11-28 RX ADMIN — ATORVASTATIN CALCIUM 40 MG: 40 TABLET, FILM COATED ORAL at 22:47

## 2018-11-28 RX ADMIN — Medication 10 ML: at 22:44

## 2018-11-28 RX ADMIN — ASPIRIN 81 MG 81 MG: 81 TABLET ORAL at 22:47

## 2018-11-28 RX ADMIN — AMIODARONE HYDROCHLORIDE 0.5 MG/MIN: 1.8 INJECTION, SOLUTION INTRAVENOUS at 21:18

## 2018-11-28 RX ADMIN — DILTIAZEM HYDROCHLORIDE 60 MG: 60 TABLET, FILM COATED ORAL at 22:47

## 2018-11-28 RX ADMIN — ACETAMINOPHEN 650 MG: 325 TABLET ORAL at 22:47

## 2018-11-28 RX ADMIN — NITROGLYCERIN 5 MCG/MIN: 20 INJECTION INTRAVENOUS at 22:25

## 2018-11-28 ASSESSMENT — PAIN DESCRIPTION - ORIENTATION: ORIENTATION: MID

## 2018-11-28 ASSESSMENT — PAIN DESCRIPTION - PAIN TYPE
TYPE: ACUTE PAIN
TYPE: ACUTE PAIN

## 2018-11-28 ASSESSMENT — PAIN SCALES - GENERAL
PAINLEVEL_OUTOF10: 5
PAINLEVEL_OUTOF10: 5

## 2018-11-28 ASSESSMENT — PAIN DESCRIPTION - LOCATION
LOCATION: CHEST
LOCATION: CHEST

## 2018-11-28 ASSESSMENT — PAIN DESCRIPTION - FREQUENCY: FREQUENCY: CONTINUOUS

## 2018-11-28 ASSESSMENT — PAIN DESCRIPTION - ONSET: ONSET: ON-GOING

## 2018-11-28 ASSESSMENT — PAIN DESCRIPTION - DESCRIPTORS: DESCRIPTORS: ACHING

## 2018-11-29 ENCOUNTER — APPOINTMENT (OUTPATIENT)
Dept: CARDIAC CATH/INVASIVE PROCEDURES | Age: 83
DRG: 246 | End: 2018-11-29
Attending: INTERNAL MEDICINE
Payer: MEDICARE

## 2018-11-29 LAB
ACTIVATED CLOTTING TIME: 235 SECONDS (ref 1–150)
ACTIVATED CLOTTING TIME: 257 SECONDS (ref 1–150)
ACTIVATED CLOTTING TIME: 307 SECONDS (ref 1–150)
ANION GAP SERPL CALCULATED.3IONS-SCNC: 14 MEQ/L (ref 8–16)
APTT: 171.2 SECONDS (ref 22–38)
BACTERIA: ABNORMAL /HPF
BASOPHILS # BLD: 0.1 %
BASOPHILS ABSOLUTE: 0 THOU/MM3 (ref 0–0.1)
BILIRUBIN URINE: NEGATIVE
BLOOD, URINE: NEGATIVE
BUN BLDV-MCNC: 18 MG/DL (ref 7–22)
CALCIUM SERPL-MCNC: 9.1 MG/DL (ref 8.5–10.5)
CASTS 2: ABNORMAL /LPF
CASTS UA: ABNORMAL /LPF
CHARACTER, URINE: ABNORMAL
CHLORIDE BLD-SCNC: 106 MEQ/L (ref 98–111)
CHOLESTEROL, TOTAL: 136 MG/DL (ref 100–199)
CO2: 18 MEQ/L (ref 23–33)
COLOR: YELLOW
CREAT SERPL-MCNC: 1 MG/DL (ref 0.4–1.2)
CRYSTALS, UA: ABNORMAL
EOSINOPHIL # BLD: 1.1 %
EOSINOPHILS ABSOLUTE: 0.1 THOU/MM3 (ref 0–0.4)
EPITHELIAL CELLS, UA: ABNORMAL /HPF
ERYTHROCYTE [DISTWIDTH] IN BLOOD BY AUTOMATED COUNT: 13.8 % (ref 11.5–14.5)
ERYTHROCYTE [DISTWIDTH] IN BLOOD BY AUTOMATED COUNT: 48.9 FL (ref 35–45)
GFR SERPL CREATININE-BSD FRML MDRD: 70 ML/MIN/1.73M2
GLUCOSE BLD-MCNC: 120 MG/DL (ref 70–108)
GLUCOSE URINE: NEGATIVE MG/DL
HCT VFR BLD CALC: 38.2 % (ref 42–52)
HDLC SERPL-MCNC: 64 MG/DL
HEMOGLOBIN: 12.3 GM/DL (ref 14–18)
IMMATURE GRANS (ABS): 0.06 THOU/MM3 (ref 0–0.07)
IMMATURE GRANULOCYTES: 0.6 %
KETONES, URINE: ABNORMAL
LDL CHOLESTEROL CALCULATED: 64 MG/DL
LEUKOCYTE ESTERASE, URINE: NEGATIVE
LV EF: 40 %
LVEF MODALITY: NORMAL
LYMPHOCYTES # BLD: 13.9 %
LYMPHOCYTES ABSOLUTE: 1.4 THOU/MM3 (ref 1–4.8)
MCH RBC QN AUTO: 31.1 PG (ref 26–33)
MCHC RBC AUTO-ENTMCNC: 32.2 GM/DL (ref 32.2–35.5)
MCV RBC AUTO: 96.5 FL (ref 80–94)
MISCELLANEOUS 2: ABNORMAL
MONOCYTES # BLD: 8.3 %
MONOCYTES ABSOLUTE: 0.9 THOU/MM3 (ref 0.4–1.3)
NITRITE, URINE: NEGATIVE
NUCLEATED RED BLOOD CELLS: 0 /100 WBC
PH UA: 5
PLATELET # BLD: 218 THOU/MM3 (ref 130–400)
PMV BLD AUTO: 9.6 FL (ref 9.4–12.4)
POC ACTIVATED CLOTTING TIME KAOLIN: 186 SECONDS (ref 1–150)
POC ACTIVATED CLOTTING TIME KAOLIN: 208 SECONDS (ref 1–150)
POTASSIUM REFLEX MAGNESIUM: 4.3 MEQ/L (ref 3.5–5.2)
PROTEIN UA: 30
RBC # BLD: 3.96 MILL/MM3 (ref 4.7–6.1)
RBC URINE: ABNORMAL /HPF
RENAL EPITHELIAL, UA: ABNORMAL
SEG NEUTROPHILS: 76 %
SEGMENTED NEUTROPHILS ABSOLUTE COUNT: 7.8 THOU/MM3 (ref 1.8–7.7)
SODIUM BLD-SCNC: 138 MEQ/L (ref 135–145)
SPECIFIC GRAVITY, URINE: 1.02 (ref 1–1.03)
TRIGL SERPL-MCNC: 38 MG/DL (ref 0–199)
TROPONIN T: 0.07 NG/ML
TROPONIN T: 0.12 NG/ML
UROBILINOGEN, URINE: 0.2 EU/DL
WBC # BLD: 10.3 THOU/MM3 (ref 4.8–10.8)
WBC UA: ABNORMAL /HPF
YEAST: ABNORMAL

## 2018-11-29 PROCEDURE — C1887 CATHETER, GUIDING: HCPCS

## 2018-11-29 PROCEDURE — 2500000003 HC RX 250 WO HCPCS

## 2018-11-29 PROCEDURE — 027034Z DILATION OF CORONARY ARTERY, ONE ARTERY WITH DRUG-ELUTING INTRALUMINAL DEVICE, PERCUTANEOUS APPROACH: ICD-10-PCS | Performed by: INTERNAL MEDICINE

## 2018-11-29 PROCEDURE — 99222 1ST HOSP IP/OBS MODERATE 55: CPT | Performed by: INTERNAL MEDICINE

## 2018-11-29 PROCEDURE — C1769 GUIDE WIRE: HCPCS

## 2018-11-29 PROCEDURE — 85347 COAGULATION TIME ACTIVATED: CPT

## 2018-11-29 PROCEDURE — 84484 ASSAY OF TROPONIN QUANT: CPT

## 2018-11-29 PROCEDURE — 80048 BASIC METABOLIC PNL TOTAL CA: CPT

## 2018-11-29 PROCEDURE — 2580000003 HC RX 258: Performed by: INTERNAL MEDICINE

## 2018-11-29 PROCEDURE — B2111ZZ FLUOROSCOPY OF MULTIPLE CORONARY ARTERIES USING LOW OSMOLAR CONTRAST: ICD-10-PCS | Performed by: INTERNAL MEDICINE

## 2018-11-29 PROCEDURE — 93306 TTE W/DOPPLER COMPLETE: CPT

## 2018-11-29 PROCEDURE — 6370000000 HC RX 637 (ALT 250 FOR IP)

## 2018-11-29 PROCEDURE — 85025 COMPLETE CBC W/AUTO DIFF WBC: CPT

## 2018-11-29 PROCEDURE — 36415 COLL VENOUS BLD VENIPUNCTURE: CPT

## 2018-11-29 PROCEDURE — 80061 LIPID PANEL: CPT

## 2018-11-29 PROCEDURE — C1725 CATH, TRANSLUMIN NON-LASER: HCPCS

## 2018-11-29 PROCEDURE — 85730 THROMBOPLASTIN TIME PARTIAL: CPT

## 2018-11-29 PROCEDURE — 6370000000 HC RX 637 (ALT 250 FOR IP): Performed by: FAMILY MEDICINE

## 2018-11-29 PROCEDURE — 99291 CRITICAL CARE FIRST HOUR: CPT | Performed by: INTERNAL MEDICINE

## 2018-11-29 PROCEDURE — 81001 URINALYSIS AUTO W/SCOPE: CPT

## 2018-11-29 PROCEDURE — 6360000004 HC RX CONTRAST MEDICATION: Performed by: INTERNAL MEDICINE

## 2018-11-29 PROCEDURE — 2709999900 HC NON-CHARGEABLE SUPPLY

## 2018-11-29 PROCEDURE — C1894 INTRO/SHEATH, NON-LASER: HCPCS

## 2018-11-29 PROCEDURE — 6360000002 HC RX W HCPCS

## 2018-11-29 PROCEDURE — C9600 PERC DRUG-EL COR STENT SING: HCPCS

## 2018-11-29 PROCEDURE — B41FZZZ FLUOROSCOPY OF RIGHT LOWER EXTREMITY ARTERIES: ICD-10-PCS | Performed by: INTERNAL MEDICINE

## 2018-11-29 PROCEDURE — 6370000000 HC RX 637 (ALT 250 FOR IP): Performed by: INTERNAL MEDICINE

## 2018-11-29 PROCEDURE — C1874 STENT, COATED/COV W/DEL SYS: HCPCS

## 2018-11-29 PROCEDURE — 93010 ELECTROCARDIOGRAM REPORT: CPT | Performed by: INTERNAL MEDICINE

## 2018-11-29 PROCEDURE — 92928 PRQ TCAT PLMT NTRAC ST 1 LES: CPT | Performed by: INTERNAL MEDICINE

## 2018-11-29 PROCEDURE — 93454 CORONARY ARTERY ANGIO S&I: CPT

## 2018-11-29 PROCEDURE — 4A023N7 MEASUREMENT OF CARDIAC SAMPLING AND PRESSURE, LEFT HEART, PERCUTANEOUS APPROACH: ICD-10-PCS | Performed by: INTERNAL MEDICINE

## 2018-11-29 PROCEDURE — 2500000003 HC RX 250 WO HCPCS: Performed by: FAMILY MEDICINE

## 2018-11-29 PROCEDURE — 2700000000 HC OXYGEN THERAPY PER DAY

## 2018-11-29 PROCEDURE — 93454 CORONARY ARTERY ANGIO S&I: CPT | Performed by: INTERNAL MEDICINE

## 2018-11-29 PROCEDURE — 2000000000 HC ICU R&B

## 2018-11-29 RX ORDER — SODIUM CHLORIDE 0.9 % (FLUSH) 0.9 %
10 SYRINGE (ML) INJECTION EVERY 12 HOURS SCHEDULED
Status: DISCONTINUED | OUTPATIENT
Start: 2018-11-29 | End: 2018-12-02 | Stop reason: HOSPADM

## 2018-11-29 RX ORDER — HYDROCODONE BITARTRATE AND ACETAMINOPHEN 7.5; 325 MG/1; MG/1
1 TABLET ORAL EVERY 6 HOURS PRN
Status: DISCONTINUED | OUTPATIENT
Start: 2018-11-29 | End: 2018-12-02 | Stop reason: HOSPADM

## 2018-11-29 RX ORDER — SODIUM CHLORIDE 0.9 % (FLUSH) 0.9 %
10 SYRINGE (ML) INJECTION EVERY 12 HOURS SCHEDULED
Status: CANCELLED | OUTPATIENT
Start: 2018-11-29

## 2018-11-29 RX ORDER — FENTANYL CITRATE 50 UG/ML
25 INJECTION, SOLUTION INTRAMUSCULAR; INTRAVENOUS
Status: DISCONTINUED | OUTPATIENT
Start: 2018-11-29 | End: 2018-12-02 | Stop reason: HOSPADM

## 2018-11-29 RX ORDER — SODIUM CHLORIDE 9 MG/ML
INJECTION, SOLUTION INTRAVENOUS CONTINUOUS
Status: CANCELLED | OUTPATIENT
Start: 2018-11-29

## 2018-11-29 RX ORDER — ACETAMINOPHEN 325 MG/1
650 TABLET ORAL EVERY 4 HOURS PRN
Status: DISCONTINUED | OUTPATIENT
Start: 2018-11-29 | End: 2018-12-02 | Stop reason: HOSPADM

## 2018-11-29 RX ORDER — SODIUM CHLORIDE 9 MG/ML
75 INJECTION, SOLUTION INTRAVENOUS CONTINUOUS
Status: DISCONTINUED | OUTPATIENT
Start: 2018-11-29 | End: 2018-11-30

## 2018-11-29 RX ORDER — NITROGLYCERIN 0.4 MG/1
0.4 TABLET SUBLINGUAL EVERY 5 MIN PRN
Status: CANCELLED | OUTPATIENT
Start: 2018-11-29

## 2018-11-29 RX ORDER — SODIUM CHLORIDE 0.9 % (FLUSH) 0.9 %
10 SYRINGE (ML) INJECTION PRN
Status: DISCONTINUED | OUTPATIENT
Start: 2018-11-29 | End: 2018-12-02 | Stop reason: HOSPADM

## 2018-11-29 RX ORDER — FENTANYL CITRATE 50 UG/ML
INJECTION, SOLUTION INTRAMUSCULAR; INTRAVENOUS
Status: COMPLETED
Start: 2018-11-29 | End: 2018-11-29

## 2018-11-29 RX ORDER — SODIUM CHLORIDE 0.9 % (FLUSH) 0.9 %
10 SYRINGE (ML) INJECTION PRN
Status: CANCELLED | OUTPATIENT
Start: 2018-11-29

## 2018-11-29 RX ORDER — BICALUTAMIDE 50 MG/1
50 TABLET, FILM COATED ORAL DAILY
Status: DISCONTINUED | OUTPATIENT
Start: 2018-11-29 | End: 2018-12-02 | Stop reason: HOSPADM

## 2018-11-29 RX ORDER — ONDANSETRON 2 MG/ML
4 INJECTION INTRAMUSCULAR; INTRAVENOUS EVERY 6 HOURS PRN
Status: DISCONTINUED | OUTPATIENT
Start: 2018-11-29 | End: 2018-12-02 | Stop reason: HOSPADM

## 2018-11-29 RX ORDER — ATROPINE SULFATE 0.4 MG/ML
0.5 AMPUL (ML) INJECTION
Status: ACTIVE | OUTPATIENT
Start: 2018-11-29 | End: 2018-11-29

## 2018-11-29 RX ADMIN — AMIODARONE HYDROCHLORIDE 0.5 MG/MIN: 1.8 INJECTION, SOLUTION INTRAVENOUS at 21:14

## 2018-11-29 RX ADMIN — LISINOPRIL 10 MG: 10 TABLET ORAL at 08:19

## 2018-11-29 RX ADMIN — ATORVASTATIN CALCIUM 40 MG: 40 TABLET, FILM COATED ORAL at 20:23

## 2018-11-29 RX ADMIN — ACETAMINOPHEN 650 MG: 325 TABLET ORAL at 14:11

## 2018-11-29 RX ADMIN — AMIODARONE HYDROCHLORIDE 0.5 MG/MIN: 1.8 INJECTION, SOLUTION INTRAVENOUS at 09:36

## 2018-11-29 RX ADMIN — ACETAMINOPHEN 650 MG: 325 TABLET ORAL at 08:33

## 2018-11-29 RX ADMIN — HYDROCODONE BITARTRATE AND ACETAMINOPHEN 1 TABLET: 7.5; 325 TABLET ORAL at 17:36

## 2018-11-29 RX ADMIN — ASPIRIN 81 MG 81 MG: 81 TABLET ORAL at 08:19

## 2018-11-29 RX ADMIN — BICALUTAMIDE 50 MG: 50 TABLET, FILM COATED ORAL at 08:19

## 2018-11-29 RX ADMIN — FENTANYL CITRATE 100 MCG: 50 INJECTION, SOLUTION INTRAMUSCULAR; INTRAVENOUS at 17:14

## 2018-11-29 RX ADMIN — DILTIAZEM HYDROCHLORIDE 60 MG: 60 TABLET, FILM COATED ORAL at 08:19

## 2018-11-29 RX ADMIN — Medication 3 MG: at 00:29

## 2018-11-29 RX ADMIN — IOPAMIDOL 90 ML: 755 INJECTION, SOLUTION INTRAVENOUS at 13:34

## 2018-11-29 RX ADMIN — SODIUM CHLORIDE 75 ML/HR: 9 INJECTION, SOLUTION INTRAVENOUS at 14:00

## 2018-11-29 RX ADMIN — DILTIAZEM HYDROCHLORIDE 60 MG: 60 TABLET, FILM COATED ORAL at 20:23

## 2018-11-29 ASSESSMENT — PAIN SCALES - GENERAL
PAINLEVEL_OUTOF10: 10
PAINLEVEL_OUTOF10: 3
PAINLEVEL_OUTOF10: 8
PAINLEVEL_OUTOF10: 0
PAINLEVEL_OUTOF10: 4
PAINLEVEL_OUTOF10: 0
PAINLEVEL_OUTOF10: 0
PAINLEVEL_OUTOF10: 3

## 2018-11-29 NOTE — PROGRESS NOTES
0800 pt resting in bed, very talkative and happy, assessment completed and charted    1000 Dr. Laura Barbour up to see pt     [phone] patient and family watching video of heart cath     1100 consent signed after teaching done and pt verbalized understanding and questions answered    1200 Dr. Lore Maries up talking with patient    [id number] pt transported to cath lab per bed with cath lab team    [id number] pt back from cath lab right groin sheath in place site soft    [id number] small hardened area noted to right groin manual  pressure held for the whole time    1600 talked with Andry Tang CNP and she talked with Dr. Lore Benson and he said to pull line with ACT at [phone] line pulled and unable to get the bleeding to stop, Dr. Reino Hodgkins came into room and held manual pressure and nurse helped hold also, bleeding got under control    1635 fem stop applied to right groin, doppler pulses present    1700 pt resting in bed    1735 removed 10 of air from fem stop    1800 removed the rest of the air from fem stop    1825 removed fem stop from right groin, applied quik clot to right groin with opsite, site soft with bruising noted    1840 family visiting    1920 reported off to [employer]

## 2018-11-29 NOTE — H&P
History & Physical    Patient:  Ese Dudley  YOB: 1927  Date of Service: 11/28/2018  MRN: [id number]   Acct:  [de-identified]   Primary Care Physician: Carolann Barreto    Chief Complaint: Chest pain, Cardiac arrest    History of Present Illness:   History obtained from chart review, the patient, wife, sons and daughter. The patient is a 80 y.o. male with h/o MI, CAD, HTN, Atrial Fib, Prostate Cancer on Chemo who presents from [facility]) in [facility], where he was evaluated for Ventricular tachycardia that disintegrated to cardiac arrest. Patient was defibrillated and was placed Amiodarone infusion on return of cardiac activity. Patient is currently alert and oriented and able to give history with family members at bedside adding minor details. Patient states that he was sitting around 1:30 pm when he experienced heaviness in his mid sternal region. He states that the pain was 10/10 in severity without radiation. He states that he experienced associated palpitations and one episode of non bloody emesis. Patient states that he was given medication and shocked after EMS arrived at the scene and was then transported to [facility] Patient currently denies chest pain at rest. He notes chest pain with movement and with deep breathing. Patient denies prior cough, fever, chills, dizziness, shortness of breath, abdominal pain or falls. Patient denies smoking and drinks alcohol occasionally. Patient had been on Eliquis for atrial Fib. He denies bleeding or clotting disorders, bloody stools or urine.        Past Medical History:        Diagnosis Date    Arthritis     CAD (coronary artery disease)     Cancer ([address]     PROSTATE    Hypertension     Irregular heart beat        Past Surgical History:        Procedure Laterality Date    COLONOSCOPY      HERNIA REPAIR      JOINT REPLACEMENT Left     HIP    JOINT REPLACEMENT Left     SHOULDER    KNEE ARTHROSCOPY Left     DC INJ DX/THER AGNT PARAVERT FACET JOINT, CERV/THORAC, 2ND LEVEL Bilateral 8/23/2018    LUMBAR FACET Bilateral L-facet MBB @ L3-4, L4-5, L5-S1. performed by Sybil Magana MD at [facility] DX/THER AGNT PARAVERT FACET JOINT, CERV/THORAC, 2ND LEVEL Bilateral 10/22/2018    Lumbar Facet MBB [email or web address] Bilateral performed by Sybil Magana MD at [address] [place] [address] Medications:   No current facility-administered medications on file prior to encounter. Current Outpatient Prescriptions on File Prior to Encounter   Medication Sig Dispense Refill    ibuprofen (ADVIL) 200 MG CAPS Take 1 capsule by mouth 2 times daily as needed for Fever      Omega-3 Fatty Acids (OMEGA-3 PLUS PO) Take 1 tablet by mouth daily      MELATONIN PO Take 1 tablet by mouth nightly      NONFORMULARY       Multiple Vitamins-Minerals (MULTIVITAMIN ADULT PO) Take by mouth      apixaban (ELIQUIS) 5 MG TABS tablet Eliquis 5 mg tablet daily      Mirabegron ER (MYRBETRIQ) 25 MG TB24 Myrbetriq 25 mg tablet,extended release daily      atorvastatin (LIPITOR) 40 MG tablet atorvastatin 40 mg tablet      bicalutamide (CASODEX) 50 MG chemo tablet Take 50 mg by mouth      diltiazem (CARDIZEM) 60 MG tablet diltiazem 60 mg tablet twice dialy      lisinopril (PRINIVIL;ZESTRIL) 10 MG tablet lisinopril 10 mg tablet    1 tablet every day by oral route. Allergies:  Patient has no known allergies. Social History:    reports that he has never smoked. He has never used smokeless tobacco. He reports that he does not drink alcohol or use drugs. Family History:   No family history on file. Review of systems:  Constitutional: no fever, no night sweats, no fatigue  Head: no headache, no head injury, no migranes. Eye: no blurring of vision, no double vision.   Ears: no hearing difficulty, no tinnitus  Mouth/throat: no ulceration, dental caries, dysphagia  Lungs: no cough, no shortness of breath, no wheeze  CVS: no palpitation, + chest pain, no shortness of breath  GI: no abdominal pain, no nausea , no vomiting, no constipation  LETITIA: no dysuria, frequency and urgency, no hematuria, no kidney stones  Musculoskeletal: no joint pain, swelling , stiffness  Endocrine: no polyuria, polydypsia, no cold or heat intolerence  Hematology: no anemia, no easy brusing or bleeding, no hx of clotting disorder  Dermatology: no skin rash, no eczema, no prurities,  Psychiatry: no depression, no anxiety,no panic attacks, no suicide ideation  Neurology: no syncope, no seizures, no numbness or tingling of hands, no numbness or tingling of feet, no paresis    10 point review of systems completed, all other than noted above are negative. Vitals:   Vitals:    11/28/18 1857   BP: 123/76   Pulse: 68   Resp: 28   Temp: 97.8 °F (36.6 °C)   SpO2: 97%      BMI: There is no height or weight on file to calculate BMI. Exam:  Physical Examination: General appearance - alert, well appearing, and in no distress  Mental status - alert, oriented to person, place, and time  Neck - supple, no significant adenopathy, no JVD, or carotid bruits  Chest - clear to auscultation, no wheezes, rales or rhonchi, symmetric air entry, + chest wall tenderness  Heart - normal rate, regular rhythm, normal S1, S2, no murmurs, rubs, clicks or gallops  Abdomen - soft, nontender, nondistended, no masses or organomegaly  Neurological - alert, oriented, normal speech, no focal findings or movement disorder noted  Musculoskeletal - no joint tenderness, deformity or swelling  Extremities - peripheral pulses normal, no pedal edema, no clubbing or cyanosis  Skin - normal coloration and turgor, no rashes, no suspicious skin lesions noted      Review of Labs and Diagnostic Testing:    No results found for this or any previous visit (from the past 24 hour(s)). Radiology:     No results found.       EKG: Sinus rhythm with 1st degree A-V block with Premature

## 2018-11-29 NOTE — TELEPHONE ENCOUNTER
Clearance approval received and scanned into EPIC media tab
Faxed cardiac clearance request to Dr Kylah Meadows for approval to hold blood thinners for 5 days prior to procedure.
Is This A New Presentation, Or A Follow-Up?: Skin Lesions
How Severe Is Your Skin Lesion?: mild
Have Your Skin Lesions Been Treated?: not been treated

## 2018-11-29 NOTE — CARE COORDINATION
11/29/18, 10:57 AM      Reno Feliciano       Admitted from: DA from [facility] 11/28/2018/ [address] [facility] day: 1   Location: [facility]- Reason for admit: Cardiac arrest [facility]) [I46.9] Status: IP  Admit order signed?: yes  PMH:  has a past medical history of Arthritis; CAD (coronary artery disease); Cancer ([address] Hypertension; and Irregular heart beat. Procedure: none  Pertinent abnormal Imaging: none  Medications:  Scheduled Meds:   bicalutamide  50 mg Oral Daily    atorvastatin  40 mg Oral Nightly    diltiazem  60 mg Oral 2 times per day    lisinopril  10 mg Oral Daily    Mirabegron ER  25 mg Oral Daily    aspirin  81 mg Oral Daily    sodium chloride flush  10 mL Intravenous 2 times per day     Continuous Infusions:   heparin (porcine) 14 Units/kg/hr (11/29/18 0955)    nitroGLYCERIN 5 mcg/min (11/28/18 8935)    Amiodarone 0.5 mg/min (11/29/18 0936)      Pertinent Info/Orders/Treatment Plan: Patient was sitting yesterday, had mid sternal heaviness, palpitations, and one non bloody emesis. EMS was called - Cardiac arrest, CPR, shocked x1, ROSC - [facility] then transferred to [facility]. Hx Afib on Eliquis, MI, prostate CA on chemo. Cardiology consulted. Echo ordered. Afebrile. Sats 95% on 1L O2. Ox4. Telemetry, I&O, daily weight, n/v checks, up with assist. Amio @ 0.5 mg/min, heparin drip, nitro @ 5 mcg/min, asa, lipitor, casodex, cardizem, lisinopril. Procal 0.14, Trop 0.090 then 0.124, Hgb 10.5 - now 12.3. Blood cultures sent. Diet: Diet NPO Effective Now   Smoking status:  reports that he has never smoked.  He has never used smokeless tobacco.   PCP: Larisa Lopez  Readmission: no  Readmission Risk Score: 7%    Discharge Planning  Current Residence:     Living Arrangements:      Support Systems:     Current Services PTA:     Potential Assistance Needed:     Potential Assistance Purchasing Medications:     Does patient want to participate in local refill/ meds to beds program?     Type of Home Care Services:     Patient expects to be discharged to:     Expected Discharge date: Follow Up Appointment: Best Day/ Time:      Discharge Plan: Spoke with Ang Garber; states he lives at home with his wife of 77 years. He did not use any DME or have any HH services PTA. He states he \"drives all over the place\" and plays cards every morning. Ang Garber states he plans to return home at discharge, denies needs, and declines HH; but adds that if he would end up needing to go somewhere, he would prefer [place].       Evaluation: no

## 2018-11-29 NOTE — CONSULTS
tablet, atorvastatin 40 mg tablet  bicalutamide (CASODEX) 50 MG chemo tablet, Take 50 mg by mouth  diltiazem (CARDIZEM) 60 MG tablet, diltiazem 60 mg tablet twice dialy  lisinopril (PRINIVIL;ZESTRIL) 10 MG tablet, lisinopril 10 mg tablet   1 tablet every day by oral route. Allergies:    Morphine    Social History:    reports that he has never smoked. He has never used smokeless tobacco. He reports that he does not drink alcohol or use drugs. Family History:   family history is not on file. REVIEW OF SYSTEMS:    Constitutional: negative for anorexia, chills and fevers,weight change  Respiratory: negative for cough, dyspnea on exertion, hemoptysis, shortness of breath and wheezing  Cardiovascular: negative for  orthopnea, palpitations and syncope. Gastrointestinal: negative for abdominal pain,nausea , vomiting, constipation, diarrhea.   Hematologic/lymphatic: negative for bruising,prolonged bleeding,blood clots  Musculoskeletal:negative for muscle weakness, myalgias,wasting  Neurological: negative for coordination problems, dizziness, gait problems and vertigo  Behavioral/Psych:negative for mood/sleep disturbance      PHYSICAL EXAM:  Vitals:Patient Vitals for the past 24 hrs:   BP Temp Temp src Pulse Resp SpO2 Height Weight   11/29/18 0819 (!) 150/97 - - - - - - -   11/29/18 0803 (!) 150/93 98 °F (36.7 °C) Oral 65 16 96 % - -   11/29/18 0703 (!) 157/70 - - 66 14 97 % - -   11/29/18 0603 (!) 157/74 - - 64 15 97 % - -   11/29/18 0503 (!) 152/70 97.6 °F (36.4 °C) Oral 65 18 97 % - -   11/29/18 0403 (!) 142/70 - - 63 9 99 % - -   11/29/18 0303 135/61 - - 58 18 94 % - -   11/29/18 0203 (!) 117/59 - - 64 24 92 % - -   11/29/18 0103 128/64 - - 62 12 99 % - -   11/29/18 0003 138/73 97.8 °F (36.6 °C) Oral 63 24 99 % - -   11/28/18 2303 (!) 150/75 - - 70 23 100 % - -   11/28/18 2203 (!) 150/71 - - 75 17 - 5' 10\" (1.778 m) 188 lb 11.4 oz (85.6 kg)   11/28/18 2103 (!) 163/76 98 °F (36.7 °C) Oral 70 18 98 % - -   11/28/18 2003 136/74 - - 72 19 96 % - -   11/28/18 1857 123/76 97.8 °F (36.6 °C) Oral 68 28 97 % - -       Last 3 weights: Wt Readings from Last 3 Encounters:   11/28/18 188 lb 11.4 oz (85.6 kg)   11/05/18 192 lb 0.3 oz (87.1 kg)   10/22/18 192 lb (87.1 kg)     24 hour intake/output:  Intake/Output Summary (Last 24 hours) at 11/29/18 0950  Last data filed at 11/29/18 0600   Gross per 24 hour   Intake          1051.37 ml   Output               50 ml   Net          1001.37 ml     BMI:Body mass index is 27.08 kg/m². General Appearance: alert and oriented to person, place and time, well developed and well- nourished, in no acute distress  Skin: warm and dry, no rash or erythema  Eyes: pupils equal, round, and reactive to light, extraocular eye movements intact, conjunctivae normal  Neck: supple and non-tender without mass, no thyromegaly or thyroid nodules, no cervical lymphadenopathy  Pulmonary/Chest: clear to auscultation bilaterally- no wheezes, rales or rhonchi, normal air movement, no respiratory distress  Cardiovascular: normal rate, regular rhythm, normal S1 and S2, no murmurs, rubs, clicks, or gallops, distal pulses intact, no carotid bruits, Negative JVD  Radial Pulses: intact 2+  Abdomen: soft, non-tender, non-distended, normal bowel sounds, no masses or organomegaly  Extremities: no cyanosis, clubbing .  Edema  Musculoskeletal: normal range of motion, no joint swelling, deformity or tenderness    LABS:  Recent Labs      11/28/18   2030  11/29/18   0230   TROPONINT  0.090*  0.124*     CBC: Lab Results   Component Value Date    WBC 10.3 11/29/2018    RBC 3.96 11/29/2018    HGB 12.3 11/29/2018    HCT 38.2 11/29/2018    MCV 96.5 11/29/2018    MCH 31.1 11/29/2018    MCHC 32.2 11/29/2018     11/29/2018    MPV 9.6 11/29/2018     BMP:  Lab Results   Component Value Date     11/29/2018    K 4.3 11/29/2018     11/29/2018    CO2 18 11/29/2018    BUN 18 11/29/2018    CREATININE 1.0 11/29/2018    CALCIUM 9.1

## 2018-11-30 ENCOUNTER — APPOINTMENT (OUTPATIENT)
Dept: GENERAL RADIOLOGY | Age: 83
DRG: 246 | End: 2018-11-30
Attending: INTERNAL MEDICINE
Payer: MEDICARE

## 2018-11-30 LAB
ANION GAP SERPL CALCULATED.3IONS-SCNC: 13 MEQ/L (ref 8–16)
BASOPHILS # BLD: 0.1 %
BASOPHILS ABSOLUTE: 0 THOU/MM3 (ref 0–0.1)
BUN BLDV-MCNC: 14 MG/DL (ref 7–22)
CALCIUM SERPL-MCNC: 8.6 MG/DL (ref 8.5–10.5)
CHLORIDE BLD-SCNC: 103 MEQ/L (ref 98–111)
CO2: 17 MEQ/L (ref 23–33)
CREAT SERPL-MCNC: 0.9 MG/DL (ref 0.4–1.2)
EKG ATRIAL RATE: 68 BPM
EKG P AXIS: 86 DEGREES
EKG P-R INTERVAL: 264 MS
EKG Q-T INTERVAL: 488 MS
EKG QRS DURATION: 154 MS
EKG QTC CALCULATION (BAZETT): 518 MS
EKG R AXIS: 89 DEGREES
EKG T AXIS: -17 DEGREES
EKG VENTRICULAR RATE: 68 BPM
EOSINOPHIL # BLD: 1.3 %
EOSINOPHILS ABSOLUTE: 0.1 THOU/MM3 (ref 0–0.4)
ERYTHROCYTE [DISTWIDTH] IN BLOOD BY AUTOMATED COUNT: 13.7 % (ref 11.5–14.5)
ERYTHROCYTE [DISTWIDTH] IN BLOOD BY AUTOMATED COUNT: 43.6 FL (ref 35–45)
GFR SERPL CREATININE-BSD FRML MDRD: 79 ML/MIN/1.73M2
GLUCOSE BLD-MCNC: 109 MG/DL (ref 70–108)
HCT VFR BLD CALC: 30.6 % (ref 42–52)
HEMOGLOBIN: 10.9 GM/DL (ref 14–18)
IMMATURE GRANS (ABS): 0.07 THOU/MM3 (ref 0–0.07)
IMMATURE GRANULOCYTES: 0.8 %
LYMPHOCYTES # BLD: 10.6 %
LYMPHOCYTES ABSOLUTE: 1 THOU/MM3 (ref 1–4.8)
MCH RBC QN AUTO: 31.2 PG (ref 26–33)
MCHC RBC AUTO-ENTMCNC: 35.6 GM/DL (ref 32.2–35.5)
MCV RBC AUTO: 87.7 FL (ref 80–94)
MONOCYTES # BLD: 8 %
MONOCYTES ABSOLUTE: 0.7 THOU/MM3 (ref 0.4–1.3)
MRSA SCREEN: NORMAL
NUCLEATED RED BLOOD CELLS: 0 /100 WBC
ORGANISM: ABNORMAL
PLATELET # BLD: 170 THOU/MM3 (ref 130–400)
PMV BLD AUTO: 10.1 FL (ref 9.4–12.4)
POTASSIUM SERPL-SCNC: 4.2 MEQ/L (ref 3.5–5.2)
RBC # BLD: 3.49 MILL/MM3 (ref 4.7–6.1)
SEG NEUTROPHILS: 79.2 %
SEGMENTED NEUTROPHILS ABSOLUTE COUNT: 7.2 THOU/MM3 (ref 1.8–7.7)
SODIUM BLD-SCNC: 133 MEQ/L (ref 135–145)
URINE CULTURE REFLEX: ABNORMAL
WBC # BLD: 9.1 THOU/MM3 (ref 4.8–10.8)

## 2018-11-30 PROCEDURE — 6370000000 HC RX 637 (ALT 250 FOR IP): Performed by: INTERNAL MEDICINE

## 2018-11-30 PROCEDURE — 93010 ELECTROCARDIOGRAM REPORT: CPT | Performed by: INTERNAL MEDICINE

## 2018-11-30 PROCEDURE — 6360000002 HC RX W HCPCS: Performed by: FAMILY MEDICINE

## 2018-11-30 PROCEDURE — 2140000000 HC CCU INTERMEDIATE R&B

## 2018-11-30 PROCEDURE — 6370000000 HC RX 637 (ALT 250 FOR IP): Performed by: FAMILY MEDICINE

## 2018-11-30 PROCEDURE — 2709999900 HC NON-CHARGEABLE SUPPLY

## 2018-11-30 PROCEDURE — 99222 1ST HOSP IP/OBS MODERATE 55: CPT | Performed by: PHYSICIAN ASSISTANT

## 2018-11-30 PROCEDURE — 2580000003 HC RX 258: Performed by: INTERNAL MEDICINE

## 2018-11-30 PROCEDURE — 80048 BASIC METABOLIC PNL TOTAL CA: CPT

## 2018-11-30 PROCEDURE — 85025 COMPLETE CBC W/AUTO DIFF WBC: CPT

## 2018-11-30 PROCEDURE — 99233 SBSQ HOSP IP/OBS HIGH 50: CPT | Performed by: INTERNAL MEDICINE

## 2018-11-30 PROCEDURE — 36415 COLL VENOUS BLD VENIPUNCTURE: CPT

## 2018-11-30 PROCEDURE — 71045 X-RAY EXAM CHEST 1 VIEW: CPT

## 2018-11-30 PROCEDURE — 93005 ELECTROCARDIOGRAM TRACING: CPT | Performed by: INTERNAL MEDICINE

## 2018-11-30 RX ORDER — CLOPIDOGREL BISULFATE 75 MG/1
75 TABLET ORAL DAILY
Status: DISCONTINUED | OUTPATIENT
Start: 2018-11-30 | End: 2018-12-02 | Stop reason: HOSPADM

## 2018-11-30 RX ORDER — METOPROLOL SUCCINATE 25 MG/1
25 TABLET, EXTENDED RELEASE ORAL DAILY
Status: DISCONTINUED | OUTPATIENT
Start: 2018-11-30 | End: 2018-12-02 | Stop reason: HOSPADM

## 2018-11-30 RX ORDER — ISOSORBIDE MONONITRATE 30 MG/1
30 TABLET, EXTENDED RELEASE ORAL DAILY
Status: DISCONTINUED | OUTPATIENT
Start: 2018-11-30 | End: 2018-12-02 | Stop reason: HOSPADM

## 2018-11-30 RX ORDER — AMIODARONE HYDROCHLORIDE 200 MG/1
200 TABLET ORAL DAILY
Status: DISCONTINUED | OUTPATIENT
Start: 2018-11-30 | End: 2018-12-02 | Stop reason: HOSPADM

## 2018-11-30 RX ADMIN — ATORVASTATIN CALCIUM 40 MG: 40 TABLET, FILM COATED ORAL at 21:13

## 2018-11-30 RX ADMIN — HYDROCODONE BITARTRATE AND ACETAMINOPHEN 1 TABLET: 7.5; 325 TABLET ORAL at 12:12

## 2018-11-30 RX ADMIN — Medication 10 ML: at 21:14

## 2018-11-30 RX ADMIN — ACETAMINOPHEN 650 MG: 325 TABLET ORAL at 21:13

## 2018-11-30 RX ADMIN — LISINOPRIL 10 MG: 10 TABLET ORAL at 09:04

## 2018-11-30 RX ADMIN — SODIUM CHLORIDE 75 ML/HR: 9 INJECTION, SOLUTION INTRAVENOUS at 01:09

## 2018-11-30 RX ADMIN — Medication 10 ML: at 12:14

## 2018-11-30 RX ADMIN — AMIODARONE HYDROCHLORIDE 200 MG: 200 TABLET ORAL at 12:13

## 2018-11-30 RX ADMIN — APIXABAN 5 MG: 5 TABLET, FILM COATED ORAL at 21:13

## 2018-11-30 RX ADMIN — ONDANSETRON 4 MG: 2 INJECTION INTRAMUSCULAR; INTRAVENOUS at 14:06

## 2018-11-30 RX ADMIN — CLOPIDOGREL BISULFATE 75 MG: 75 TABLET ORAL at 09:58

## 2018-11-30 RX ADMIN — METOPROLOL SUCCINATE 25 MG: 25 TABLET, FILM COATED, EXTENDED RELEASE ORAL at 12:13

## 2018-11-30 RX ADMIN — HYDROCODONE BITARTRATE AND ACETAMINOPHEN 1 TABLET: 7.5; 325 TABLET ORAL at 06:21

## 2018-11-30 RX ADMIN — ISOSORBIDE MONONITRATE 30 MG: 30 TABLET ORAL at 12:13

## 2018-11-30 RX ADMIN — ASPIRIN 81 MG 81 MG: 81 TABLET ORAL at 09:58

## 2018-11-30 RX ADMIN — APIXABAN 5 MG: 5 TABLET, FILM COATED ORAL at 12:16

## 2018-11-30 RX ADMIN — DILTIAZEM HYDROCHLORIDE 60 MG: 60 TABLET, FILM COATED ORAL at 09:04

## 2018-11-30 RX ADMIN — BICALUTAMIDE 50 MG: 50 TABLET, FILM COATED ORAL at 09:59

## 2018-11-30 RX ADMIN — ACETAMINOPHEN 650 MG: 325 TABLET ORAL at 09:03

## 2018-11-30 ASSESSMENT — PAIN DESCRIPTION - FREQUENCY: FREQUENCY: INTERMITTENT

## 2018-11-30 ASSESSMENT — PAIN DESCRIPTION - LOCATION
LOCATION: CHEST
LOCATION: CHEST;SHOULDER

## 2018-11-30 ASSESSMENT — PAIN DESCRIPTION - DESCRIPTORS
DESCRIPTORS: ACHING
DESCRIPTORS: ACHING

## 2018-11-30 ASSESSMENT — PAIN DESCRIPTION - PAIN TYPE
TYPE: ACUTE PAIN;CHRONIC PAIN
TYPE: ACUTE PAIN

## 2018-11-30 ASSESSMENT — PAIN SCALES - GENERAL
PAINLEVEL_OUTOF10: 5
PAINLEVEL_OUTOF10: 6
PAINLEVEL_OUTOF10: 6
PAINLEVEL_OUTOF10: 0
PAINLEVEL_OUTOF10: 5
PAINLEVEL_OUTOF10: 6

## 2018-11-30 ASSESSMENT — PAIN DESCRIPTION - PROGRESSION
CLINICAL_PROGRESSION: NOT CHANGED
CLINICAL_PROGRESSION: GRADUALLY IMPROVING

## 2018-11-30 ASSESSMENT — PAIN DESCRIPTION - ORIENTATION: ORIENTATION: MID;RIGHT;LEFT

## 2018-11-30 NOTE — PROGRESS NOTES
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 \" 89yrs young; s/p cath with stent; lipid panel wnl; BMI 26.2; encouraged continued good eating habits;daughter in law at bedside also; pt. Active pta     · Verbally reviewed following information with pt.: Healthy Nutrition Guidelines  · Contact name and number provided.     Electronically signed by Everardo Buitrago RD, LD on 11/30/18 at 2:49 PM    Contact Number: [phone]

## 2018-11-30 NOTE — PROGRESS NOTES
electric cardioversion which flipped him into atrial fibrillation with a controlled ventricular response. He was started on amiodarone and was found to have an elevated troponin. He was transferred to [address] [place] [address] 11/28/18 with acute coronary syndrome. He was treated with amiodarone drip, heparin drip, and nitroglycerin. He was evaluated by cardiology and underwent cardiac catheterization 11/29/18 with stenting of the proximal right coronary artery. .  ROS:  chest pain has resolved. No cough. No fever. No wheezing. PMH:  Per HPI  SHX:  Lifetime nonsmoker  FHX: No known tuberculosis exposure  Allergies: Morphine  Medications:       heparin (porcine) 14 Units/kg/hr (11/29/18 0955)      clopidogrel  75 mg Oral Daily    metoprolol succinate  25 mg Oral Daily    amiodarone  200 mg Oral Daily    isosorbide mononitrate  30 mg Oral Daily    bicalutamide  50 mg Oral Daily    sodium chloride flush  10 mL Intravenous 2 times per day    atorvastatin  40 mg Oral Nightly    lisinopril  10 mg Oral Daily    Mirabegron ER  25 mg Oral Daily    aspirin  81 mg Oral Daily       sodium chloride flush 10 mL PRN   acetaminophen 650 mg Q4H PRN   magnesium hydroxide 30 mL Daily PRN   ondansetron 4 mg Q6H PRN   HYDROcodone-acetaminophen 1 tablet Q6H PRN   fentanNYL 25 mcg Q2H PRN   melatonin 3 mg Nightly PRN   heparin (porcine) 80 Units/kg PRN   heparin (porcine) 40 Units/kg PRN   ondansetron 4 mg Q6H PRN       Vital Signs: T: 98: P: 69: RR: 18: B/P: 141/68: FiO2: Room air: O2 Sat: 91%: I/O: 716/300  General:   Elderly white male who looks younger than his documented age. HEENT:  normocephalic and atraumatic. No scleral icterus. Neck: supple. No JVD. Lungs: clear to auscultation. No retractions  Cardiac: RRR  Abdomen: soft. Nontender. Extremities:  No clubbing, cyanosis, or edema x 4. Right groin site is oozing. Vasculature: capillary refill < 3 seconds. Skin:  warm and dry.   Psych:  Alert and

## 2018-11-30 NOTE — PROGRESS NOTES
MI Documentation    MI: NSTEMI      PCI: yes    EF: 40%  · ASA w/i first 24 hours: yes      · BB w/i first 24 hours: yes        ------------------------------------------  1. ASA: yes - planning on stopping at D/C d/t patient being on eliquis  2. BB: metoprolol (toprol)  3. ACE/ARB: lisinopril  4. Statin: atorvastatin  5.   P2Y12: clopidogrel    ))))))REMEMBER NTG SL AT DISCHARGE((((((

## 2018-11-30 NOTE — PROGRESS NOTES
Acct: [de-identified]  Admit Date:  11/28/2018  Primary Cardiologist: Nemo Zamora MD    Chief Complaint/Reason for Consultation: Cardiac arrest    Subjective (Events in last 24 hours):   States that his chest is sore were they preformed CPR.   Denies any SOB or palpitations      Objective:   /65   Pulse 70   Temp 98.1 °F (36.7 °C) (Oral)   Resp 22   Ht 5' 10\" (1.778 m)   Wt 182 lb 12.2 oz (82.9 kg)   SpO2 93%   BMI 26.22 kg/m²        TELEMETRY: NSR    Physical Exam:  General Appearance: alert and oriented to person, place and time, in no acute distress  Cardiovascular: normal rate, regular rhythm, normal S1 and S2, no murmurs, rubs, clicks, or gallops, distal pulses intact, no carotid bruits, no JVD  Pulmonary/Chest: clear to auscultation bilaterally- no wheezes, rales or rhonchi, normal air movement, no respiratory distress  Abdomen: soft, non-tender, non-distended, normal bowel sounds, no masses Extremities: no cyanosis, clubbing or edema, pulse   Skin: warm and dry, no rash or erythema  Head: normocephalic and atraumatic  Eyes: pupils equal, round, and reactive to light  Neck: supple and non-tender without mass, no thyromegaly   Musculoskeletal: normal range of motion, no joint swelling, deformity or tenderness  Neurological: alert, oriented, normal speech, no focal findings or movement disorder noted    Medications:    clopidogrel  75 mg Oral Daily    metoprolol succinate  25 mg Oral Daily    amiodarone  200 mg Oral Daily    isosorbide mononitrate  30 mg Oral Daily    apixaban  5 mg Oral BID    bicalutamide  50 mg Oral Daily    sodium chloride flush  10 mL Intravenous 2 times per day    atorvastatin  40 mg Oral Nightly    lisinopril  10 mg Oral Daily    Mirabegron ER  25 mg Oral Daily    aspirin  81 mg Oral Daily         sodium chloride flush 10 mL PRN   acetaminophen 650 mg Q4H PRN   magnesium hydroxide 30 mL Daily PRN   ondansetron 4 mg Q6H PRN   HYDROcodone-acetaminophen 1 tablet Q6H

## 2018-12-01 LAB
ALBUMIN SERPL-MCNC: 3.3 G/DL (ref 3.5–5.1)
ALP BLD-CCNC: 61 U/L (ref 38–126)
ALT SERPL-CCNC: 79 U/L (ref 11–66)
ANION GAP SERPL CALCULATED.3IONS-SCNC: 11 MEQ/L (ref 8–16)
AST SERPL-CCNC: 54 U/L (ref 5–40)
BASOPHILS # BLD: 0.1 %
BASOPHILS ABSOLUTE: 0 THOU/MM3 (ref 0–0.1)
BILIRUB SERPL-MCNC: 0.7 MG/DL (ref 0.3–1.2)
BUN BLDV-MCNC: 15 MG/DL (ref 7–22)
CALCIUM SERPL-MCNC: 8.8 MG/DL (ref 8.5–10.5)
CHLORIDE BLD-SCNC: 101 MEQ/L (ref 98–111)
CO2: 21 MEQ/L (ref 23–33)
CREAT SERPL-MCNC: 1.2 MG/DL (ref 0.4–1.2)
EOSINOPHIL # BLD: 2 %
EOSINOPHILS ABSOLUTE: 0.2 THOU/MM3 (ref 0–0.4)
ERYTHROCYTE [DISTWIDTH] IN BLOOD BY AUTOMATED COUNT: 13.6 % (ref 11.5–14.5)
ERYTHROCYTE [DISTWIDTH] IN BLOOD BY AUTOMATED COUNT: 46.9 FL (ref 35–45)
GFR SERPL CREATININE-BSD FRML MDRD: 57 ML/MIN/1.73M2
GLUCOSE BLD-MCNC: 106 MG/DL (ref 70–108)
HCT VFR BLD CALC: 30 % (ref 42–52)
HEMOGLOBIN: 9.9 GM/DL (ref 14–18)
IMMATURE GRANS (ABS): 0.06 THOU/MM3 (ref 0–0.07)
IMMATURE GRANULOCYTES: 0.7 %
LYMPHOCYTES # BLD: 15.9 %
LYMPHOCYTES ABSOLUTE: 1.4 THOU/MM3 (ref 1–4.8)
MCH RBC QN AUTO: 30.8 PG (ref 26–33)
MCHC RBC AUTO-ENTMCNC: 33 GM/DL (ref 32.2–35.5)
MCV RBC AUTO: 93.5 FL (ref 80–94)
MONOCYTES # BLD: 11 %
MONOCYTES ABSOLUTE: 1 THOU/MM3 (ref 0.4–1.3)
NUCLEATED RED BLOOD CELLS: 0 /100 WBC
PLATELET # BLD: 186 THOU/MM3 (ref 130–400)
PMV BLD AUTO: 9.9 FL (ref 9.4–12.4)
POTASSIUM SERPL-SCNC: 4.4 MEQ/L (ref 3.5–5.2)
RBC # BLD: 3.21 MILL/MM3 (ref 4.7–6.1)
SEG NEUTROPHILS: 70.3 %
SEGMENTED NEUTROPHILS ABSOLUTE COUNT: 6.4 THOU/MM3 (ref 1.8–7.7)
SODIUM BLD-SCNC: 133 MEQ/L (ref 135–145)
TOTAL PROTEIN: 5.4 G/DL (ref 6.1–8)
TSH SERPL DL<=0.05 MIU/L-ACNC: 2.35 UIU/ML (ref 0.4–4.2)
WBC # BLD: 9.1 THOU/MM3 (ref 4.8–10.8)

## 2018-12-01 PROCEDURE — 84443 ASSAY THYROID STIM HORMONE: CPT

## 2018-12-01 PROCEDURE — 6370000000 HC RX 637 (ALT 250 FOR IP): Performed by: INTERNAL MEDICINE

## 2018-12-01 PROCEDURE — 99231 SBSQ HOSP IP/OBS SF/LOW 25: CPT | Performed by: PHYSICIAN ASSISTANT

## 2018-12-01 PROCEDURE — 2580000003 HC RX 258: Performed by: INTERNAL MEDICINE

## 2018-12-01 PROCEDURE — 97530 THERAPEUTIC ACTIVITIES: CPT

## 2018-12-01 PROCEDURE — G8979 MOBILITY GOAL STATUS: HCPCS

## 2018-12-01 PROCEDURE — 97166 OT EVAL MOD COMPLEX 45 MIN: CPT

## 2018-12-01 PROCEDURE — G8988 SELF CARE GOAL STATUS: HCPCS

## 2018-12-01 PROCEDURE — G8978 MOBILITY CURRENT STATUS: HCPCS

## 2018-12-01 PROCEDURE — 99233 SBSQ HOSP IP/OBS HIGH 50: CPT | Performed by: FAMILY MEDICINE

## 2018-12-01 PROCEDURE — 6370000000 HC RX 637 (ALT 250 FOR IP): Performed by: FAMILY MEDICINE

## 2018-12-01 PROCEDURE — G8987 SELF CARE CURRENT STATUS: HCPCS

## 2018-12-01 PROCEDURE — 2709999900 HC NON-CHARGEABLE SUPPLY

## 2018-12-01 PROCEDURE — 97110 THERAPEUTIC EXERCISES: CPT

## 2018-12-01 PROCEDURE — 80053 COMPREHEN METABOLIC PANEL: CPT

## 2018-12-01 PROCEDURE — 2140000000 HC CCU INTERMEDIATE R&B

## 2018-12-01 PROCEDURE — 97162 PT EVAL MOD COMPLEX 30 MIN: CPT

## 2018-12-01 PROCEDURE — 36415 COLL VENOUS BLD VENIPUNCTURE: CPT

## 2018-12-01 PROCEDURE — 85025 COMPLETE CBC W/AUTO DIFF WBC: CPT

## 2018-12-01 RX ADMIN — APIXABAN 5 MG: 5 TABLET, FILM COATED ORAL at 20:06

## 2018-12-01 RX ADMIN — Medication 10 ML: at 20:07

## 2018-12-01 RX ADMIN — CLOPIDOGREL BISULFATE 75 MG: 75 TABLET ORAL at 08:48

## 2018-12-01 RX ADMIN — ACETAMINOPHEN 650 MG: 325 TABLET ORAL at 03:51

## 2018-12-01 RX ADMIN — Medication 10 ML: at 20:06

## 2018-12-01 RX ADMIN — LISINOPRIL 10 MG: 10 TABLET ORAL at 08:48

## 2018-12-01 RX ADMIN — ATORVASTATIN CALCIUM 40 MG: 40 TABLET, FILM COATED ORAL at 20:06

## 2018-12-01 RX ADMIN — Medication 10 ML: at 08:49

## 2018-12-01 RX ADMIN — AMIODARONE HYDROCHLORIDE 200 MG: 200 TABLET ORAL at 08:48

## 2018-12-01 RX ADMIN — BICALUTAMIDE 50 MG: 50 TABLET, FILM COATED ORAL at 08:45

## 2018-12-01 RX ADMIN — ACETAMINOPHEN 650 MG: 325 TABLET ORAL at 08:48

## 2018-12-01 RX ADMIN — ACETAMINOPHEN 650 MG: 325 TABLET ORAL at 17:57

## 2018-12-01 RX ADMIN — ISOSORBIDE MONONITRATE 30 MG: 30 TABLET ORAL at 08:48

## 2018-12-01 RX ADMIN — ASPIRIN 81 MG 81 MG: 81 TABLET ORAL at 08:48

## 2018-12-01 RX ADMIN — METOPROLOL SUCCINATE 25 MG: 25 TABLET, FILM COATED, EXTENDED RELEASE ORAL at 08:48

## 2018-12-01 RX ADMIN — APIXABAN 5 MG: 5 TABLET, FILM COATED ORAL at 08:48

## 2018-12-01 RX ADMIN — ACETAMINOPHEN 650 MG: 325 TABLET ORAL at 13:43

## 2018-12-01 ASSESSMENT — PAIN DESCRIPTION - DESCRIPTORS
DESCRIPTORS: ACHING

## 2018-12-01 ASSESSMENT — PAIN DESCRIPTION - PAIN TYPE
TYPE: ACUTE PAIN;CHRONIC PAIN

## 2018-12-01 ASSESSMENT — PAIN DESCRIPTION - ORIENTATION
ORIENTATION: RIGHT;LOWER
ORIENTATION: RIGHT
ORIENTATION: RIGHT

## 2018-12-01 ASSESSMENT — PAIN SCALES - GENERAL
PAINLEVEL_OUTOF10: 5
PAINLEVEL_OUTOF10: 5
PAINLEVEL_OUTOF10: 4
PAINLEVEL_OUTOF10: 5
PAINLEVEL_OUTOF10: 5
PAINLEVEL_OUTOF10: 0
PAINLEVEL_OUTOF10: 5

## 2018-12-01 ASSESSMENT — PAIN DESCRIPTION - PROGRESSION
CLINICAL_PROGRESSION: GRADUALLY IMPROVING
CLINICAL_PROGRESSION: GRADUALLY IMPROVING

## 2018-12-01 ASSESSMENT — PAIN DESCRIPTION - LOCATION
LOCATION: SHOULDER;BACK

## 2018-12-01 ASSESSMENT — PAIN DESCRIPTION - FREQUENCY
FREQUENCY: INTERMITTENT
FREQUENCY: INTERMITTENT
FREQUENCY: CONTINUOUS

## 2018-12-01 NOTE — PROGRESS NOTES
[address]  INPATIENT OCCUPATIONAL THERAPY  [facility] CCU-STEPDOWN 3B  EVALUATION    Time:  Time In: 912  Time Out: 1603  Timed Code Treatment Minutes: 23 Minutes  Minutes: 38          Date: 2018  Patient Name: Jefry Downing,   Gender: male      MRN: [id number]  : 1927  (80 y.o.)  Referring Practitioner: Eve Allred MD  Diagnosis: cardiac arrest  Additional Pertinent Hx: 80 y.o. male with h/o MI, CAD, HTN, Atrial Fib, Prostate Cancer on Chemo who presents from [facility]) in [facility], where he was evaluated for Ventricular tachycardia that disintegrated to cardiac arrest. Patient was defibrillated and was placed Amiodarone infusion on return of cardiac activity. Cardiac cath with PCI/FITO to RCA 18    Restrictions/Precautions:  General Precautions, Fall Risk                            Past Medical History:   Diagnosis Date    Arthritis     CAD (coronary artery disease)     [address] ([facility] [address]     PROSTATE    Hypertension     Irregular heart beat      Past Surgical History:   Procedure Laterality Date    COLONOSCOPY      HERNIA REPAIR      JOINT REPLACEMENT Left     HIP    JOINT REPLACEMENT Left     SHOULDER    KNEE ARTHROSCOPY Left     MT INJ DX/THER AGNT PARAVERT FACET JOINT, CERV/THORAC, 2ND LEVEL Bilateral 2018    LUMBAR FACET Bilateral L-facet MBB @ L3-4, L4-5, L5-S1. performed by Regina Lim MD at [facility] DX/THER AGNT PARAVERT FACET JOINT, CERV/THORAC, 2ND LEVEL Bilateral 10/22/2018    Lumbar Facet MBB [email or web address] Bilateral performed by Regina Lim MD at [address] [place] [address]           Subjective  Chart Reviewed: Yes (H&P, orders, progress notes)  Patient assessed for rehabilitation services?: Yes  Family / Caregiver Present: No    Subjective: Pt seated at EOB upon arrival, pleasant and agreeable to OT session. Pt very talkative throughout. Comments: RN ok'd session.      General:  Overall Orientation Status: Within Independent (at EOB)  Standing Balance: Stand by assistance     Time: X2 minutes; X3-4 minutes  Activity: wall slides; discussion with nurses/MD in [facility]  Comment: Pt was able to stand without support on RW to complete wall slides and was able to release BUE from RW occasionally while standing to have conversation with staff. No LOB. Functional Mobility  Functional - Mobility Device: Rolling Walker  Activity: Other (3B/3A units)  Assist Level: Stand by assistance  Functional Mobility Comments: Steady throughout although pt reported slightly slower pace and taking smaller more hesitant steps than before admission. No LOB throughout. Type of ROM/Therapeutic Exercise  Type of ROM/Therapeutic Exercise: AROM, AAROM  Comment: Pt c/o R shoulder pain since line was pulled from RUE in ICU, requiring increased pressure on arm to stop bleeding. Pt completed AROM exercises of shoulder flex/ext, ABD/ADD, and horizontal ABD/ADD X5 reps, although limitations noted. With slight assistance from OTR, pt was able to achieve slightly greater ROM with decreased pain and completed AAROm X5 reps. Pt then progressed to completing table top slides X10 reps, wall slides X5 reps as reported increased pain/discomfort. Exercises completed to increase overall ROM/decrease pain as needed for improved performance with overhead ADL/IADL tasks. Activity Tolerance:  Activity Tolerance: Patient Tolerated treatment well  Activity Tolerance: Pt provided with handout re: R shoulder exercises to continue completion at home, with instructions to complete 2-3x/day and progress as able. Pt requesting use of RW right now for increased stability, and reported will likely want to use for short time upon returning home. Pt was able to demo short distance mobility of ~5 ft without RW, although was noted to furniture walk.  MD informed and reported would write script for RW as pt is wishing to

## 2018-12-01 NOTE — PROGRESS NOTES
acceptable  15. Prostate cancer -- cont casodex, mirabegron -- follows urology Dr. Griselda Brand  16. Chronic back pain/lumbar stenosis/Lumbar DDD/lumbar spondylosis -- was seen by  pain mgmt Dr. Riley Dent office 11/5/18 and was to return for RFA bilateral left side L3-4, 4-5, 5-S1 -- stable  17. Mild dementia   18. Deconditioning -- due to above - PT/OT evals done am 12/1 and did very well and ambulated halls x 2 -- agreeable to [place] at d/c    Dispo  -- 12/1 --> apprec cardio assist -- d/w FAVIO Colvin - increase activity today - cont monitor soha of meds and monitor tele -- monitor BP, lytes, LFT, HR/Tele. Increase activity and home with [place] likely tomorrow.         Chief Complaint: cardiac arrest    Hospital Course: Esteban Giordano is a 80 y.o. male with CAD, OA, HTN, PAF, HLP, prostate cancer admitted to ICU from [facility] with cardiac arrest.      As documented by Dr. Paul Wolf intensivist 11/30 \"Patient was brought to an outlying emergency room after being at home starting to have some chest pain and sweating.  He lost consciousness. Alferd Brand had neighbors which responded to calls for help from his wife. Dany Mcmahan put him on the floor and started CPR.  EMS was contacted and he was found to have ventricular tachycardia.  He underwent CPR and recovered. Alfred Brand was transported to the emergency room where he was admitted to telemetry and talking with the physician there. Alfred Brand developed another episode of ventricular tachycardia had a pulse with this one. Alfred Brand underwent electric cardioversion which flipped him into atrial fibrillation with a controlled ventricular response.  He was started on amiodarone and was found to have an elevated troponin.  He was transferred to [facility] 11/28/18 with acute coronary syndrome. Alfred Brand was treated with amiodarone drip, heparin drip, and nitroglycerin.  He was evaluated by cardiology and underwent cardiac catheterization 11/29/18 with stenting of the proximal right coronary artery\"    Subjective:   -- 12/1/2018  --> pt c/o soreness in chest from compressions and pain in right shoulder with movement. No other cp or sob. Walked entire AB unit x 2 this am with PT and OT. Denies abd pain, n/v but hasn't eaten much yet. Denies f/c.  Lakhwinder po. On RA. Afebrile. Ambulating without difficulty. Last BM 11/29      Medications:  Reviewed    Infusion Medications   Scheduled Medications    clopidogrel  75 mg Oral Daily    metoprolol succinate  25 mg Oral Daily    amiodarone  200 mg Oral Daily    isosorbide mononitrate  30 mg Oral Daily    apixaban  5 mg Oral BID    bicalutamide  50 mg Oral Daily    sodium chloride flush  10 mL Intravenous 2 times per day    atorvastatin  40 mg Oral Nightly    lisinopril  10 mg Oral Daily    Mirabegron ER  25 mg Oral Daily    aspirin  81 mg Oral Daily     PRN Meds: sodium chloride flush, acetaminophen, magnesium hydroxide, ondansetron, HYDROcodone-acetaminophen, fentanNYL, melatonin, ondansetron      Intake/Output Summary (Last 24 hours) at 12/01/18 3552  Last data filed at 11/30/18 2114   Gross per 24 hour   Intake              920 ml   Output              100 ml   Net              820 ml       Diet:  DIET CARDIAC; Exam:  /60   Pulse 68   Temp 97.6 °F (36.4 °C) (Oral)   Resp 18   Ht 5' 10\" (1.778 m)   Wt 199 lb (90.3 kg)   SpO2 92%   BMI 28.55 kg/m²     General appearance: No apparent distress, appears stated age and cooperative. HEENT: Pupils equal, round, and reactive to light. Conjunctivae/corneas clear. Neck: Supple, with full range of motion. No jugular venous distention. Trachea midline. Respiratory:  Normal respiratory effort. Clear to auscultation, bilaterally without Rales/Wheezes/Rhonchi. No respiratory distress or accessory muscle use. Cardiovascular: Regular rate and rhythm with normal S1/S2, 2/6 MATI, no rubs or gallops. Abdomen: Soft, non-tender, non-distended with normal bowel sounds.   No rebound or guarding  Musculoskeletal: No clubbing, cyanosis or edema bilaterally. Full range of motion without deformity. No calf tenderness palpation  Skin: Skin color, texture, turgor normal.  No rashes or lesions. Neurologic:  Neurovascularly intact without any focal sensory/motor deficits. Cranial nerves: II-XII intact, grossly non-focal.  Psychiatric: Alert and oriented, thought content appropriate, normal insight  Capillary Refill: Brisk,< 3 seconds   Peripheral Pulses: +2 palpable, equal bilaterally       Labs:   Recent Labs      11/29/18   0750  11/30/18   0336  12/01/18   0402   WBC  10.3  9.1  9.1   HGB  12.3*  10.9*  9.9*   HCT  38.2*  30.6*  30.0*   PLT  218  170  186     Recent Labs      11/29/18   0750  11/30/18   0336  12/01/18   0402   NA  138  133*  133*   K  4.3  4.2  4.4   CL  106  103  101   CO2  18*  17*  21*   BUN  18  14  15   CREATININE  1.0  0.9  1.2   CALCIUM  9.1  8.6  8.8     Recent Labs      12/01/18   0402   AST  54*   ALT  79*   BILITOT  0.7   ALKPHOS  61     No results for input(s): INR in the last 72 hours. No results for input(s): Juliana Lieu in the last 72 hours. Urinalysis:    Lab Results   Component Value Date    NITRU NEGATIVE 11/29/2018    WBCUA 5-10 11/29/2018    BACTERIA FEW 11/29/2018    RBCUA 0-2 11/29/2018    BLOODU NEGATIVE 11/29/2018    GLUCOSEU NEGATIVE 11/29/2018       Radiology:  XR CHEST PORTABLE   Final Result      Interstitial prominence, acute versus chronic. See discussion above and correlate clinically. Left lower lobe atelectasis versus infiltrate obscuring the diaphragm. Moderate cardiomegaly. **This report has been created using voice recognition software. It may contain minor errors which are inherent in voice recognition technology. **      Final report electronically signed by Dr. Brooke Pryor on 11/30/2018 6:46 AM          Diet: DIET CARDIAC;     Green: no    Microbiology:  MRSA screen (-)    Urine cx 11/28 = contaminants    Blood cx 11/28 (-)

## 2018-12-01 NOTE — PROGRESS NOTES
[address] [address]  INPATIENT PHYSICAL THERAPY  EVALUATION  [facility] 3B-29[facility]-A    Time In: 4580  Time Out: 1119  Timed Code Treatment Minutes: 9 Minutes  Minutes: 27          Date: 2018  Patient Name: Esteban Giordano,  Gender:  male        MRN: [id number]  : 1927  (80 y.o.)      Referring Practitioner: Dr. Dharmesh Marinelli  Diagnosis: cardiac arrest  Additional Pertinent Hx: 80 y.o. male with h/o MI, CAD, HTN, Atrial Fib, Prostate Cancer on Chemo who presents from [facility]) in [facility], where he was evaluated for Ventricular tachycardia that disintegrated to cardiac arrest. Patient was defibrillated and was placed Amiodarone infusion on return of cardiac activity.  Cardiac cath with PCI/FITO to RCA 18     Past Medical History:   Diagnosis Date    Arthritis     CAD (coronary artery disease)     Cancer ([facility] [address]     PROSTATE    Hypertension     Irregular heart beat      Past Surgical History:   Procedure Laterality Date    COLONOSCOPY      HERNIA REPAIR      JOINT REPLACEMENT Left     HIP    JOINT REPLACEMENT Left     SHOULDER    KNEE ARTHROSCOPY Left     IL INJ DX/THER AGNT PARAVERT FACET JOINT, CERV/THORAC, 2ND LEVEL Bilateral 2018    LUMBAR FACET Bilateral L-facet MBB @ L3-4, L4-5, L5-S1. performed by Julieth Currie MD at [facility] DX/THER AGNT PARAVERT FACET JOINT, CERV/THORAC, 2ND LEVEL Bilateral 10/22/2018    Lumbar Facet MBB [email or web address] [email or web address] performed by Julieth Currie MD at [facility]       Restrictions/Precautions:  General Precautions, Fall Risk    Subjective:  Chart Reviewed: Yes  Patient assessed for rehabilitation services?: Yes  Family / Caregiver Present: No  Subjective: pleasant and cooperative, pt wanting to use RW and stating will get one from local pharmacy at discharge-hospitalist aware    General:  Vision: Impaired  Vision Exceptions: Wears glasses at all times  Hearing: Within functional limits    Pain:  Yes. Pain Assessment  Pain Level: 5 (right shoulder and chest per pt from receiving CPR)       Social/Functional History:    Lives With: Spouse  Type of Home: House  Home Layout: One level  Home Equipment:  (none; pt reported wife has RW although is currently using)   Bathroom Shower/Tub: Walk-in shower  Bathroom Toilet: Standard  Bathroom Equipment:  (none)  ADL Assistance: Independent  Homemaking Assistance: Independent  Ambulation Assistance: Independent  Transfer Assistance: Independent  Active : Yes  Leisure & Hobbies: playing cards  Additional Comments: Pt reported being very active prior, used to referree high school games. Pt reported did not use any AD for mobility prior. Objective:       RLE AROM: WFL    LLE AROM : WFL    Strength RLE: WFL    Strength LLE: WFL    Sensation  Overall Sensation Status: WNL    Balance  Sitting - Static: Good  Sitting - Dynamic: Good  Standing - Static: Good  Standing - Dynamic: Good, -    Rolling to Left: Independent  Rolling to Right: Independent  Supine to Sit: Independent  Scooting: Independent (in sitting fwd and bwd on bed)    Transfers  Sit to Stand: Supervision (edge of bed)  Stand to sit: Supervision       Ambulation 1  Surface: level tile  Device: Rolling Walker  Assistance: Supervision  Quality of Gait: steady with use of RW, min fwd head and trunk posture, good silvestre,   Distance: 325'x1           Exercises:  Comments: none       Activity Tolerance:  Activity Tolerance: Patient Tolerated treatment well    Treatment Initiated:sit edge of bed around 15 min with MOD I, discussed use of RW, vitals taken and stable, pt pleasantly very talkative. Assessment:   Body structures, Functions, Activity limitations: Decreased functional mobility , Decreased balance  Assessment: pt tolerated well, pt use of RW and S for mobility, min dec balance, recommend cont PT to inc pt I with functional mobility  Prognosis: Excellent    Clinical Presentation:

## 2018-12-01 NOTE — PROGRESS NOTES
Acct: [de-identified]  Admit Date:  11/28/2018  Primary Cardiologist: Love Valenzuela MD    Chief Complaint/Reason for Consultation: Cardiac arrest    Subjective (Events in last 24 hours):   11/30/2018  States that his chest is sore were they preformed CPR. Denies any SOB or palpitations    12/1/2018  Has musculoskeletal pain from CPR. Ambulating in the hallways without difficulty.       Objective:   /60   Pulse 68   Temp 97.6 °F (36.4 °C) (Oral)   Resp 18   Ht 5' 10\" (1.778 m)   Wt 199 lb (90.3 kg)   SpO2 92%   BMI 28.55 kg/m²        TELEMETRY: NSR    Physical Exam:  General Appearance: alert and oriented to person, place and time, in no acute distress  Cardiovascular: normal rate, regular rhythm, normal S1 and S2, no murmurs, rubs, clicks, or gallops, distal pulses intact, no carotid bruits, no JVD  Pulmonary/Chest: clear to auscultation bilaterally- no wheezes, rales or rhonchi, normal air movement, no respiratory distress  Abdomen: soft, non-tender, non-distended, normal bowel sounds, no masses Extremities: no cyanosis, clubbing or edema, pulse   Skin: warm and dry, no rash or erythema  Head: normocephalic and atraumatic  Eyes: pupils equal, round, and reactive to light  Neck: supple and non-tender without mass, no thyromegaly   Musculoskeletal: normal range of motion, no joint swelling, deformity or tenderness  Neurological: alert, oriented, normal speech, no focal findings or movement disorder noted    Medications:    clopidogrel  75 mg Oral Daily    metoprolol succinate  25 mg Oral Daily    amiodarone  200 mg Oral Daily    isosorbide mononitrate  30 mg Oral Daily    apixaban  5 mg Oral BID    bicalutamide  50 mg Oral Daily    sodium chloride flush  10 mL Intravenous 2 times per day    atorvastatin  40 mg Oral Nightly    lisinopril  10 mg Oral Daily    Mirabegron ER  25 mg Oral Daily    aspirin  81 mg Oral Daily         sodium chloride flush 10 mL PRN   acetaminophen 650 mg Q4H PRN

## 2018-12-02 ENCOUNTER — APPOINTMENT (OUTPATIENT)
Dept: GENERAL RADIOLOGY | Age: 83
DRG: 246 | End: 2018-12-02
Attending: INTERNAL MEDICINE
Payer: MEDICARE

## 2018-12-02 VITALS
HEIGHT: 70 IN | TEMPERATURE: 97.9 F | RESPIRATION RATE: 18 BRPM | HEART RATE: 59 BPM | OXYGEN SATURATION: 92 % | SYSTOLIC BLOOD PRESSURE: 132 MMHG | BODY MASS INDEX: 28.53 KG/M2 | DIASTOLIC BLOOD PRESSURE: 64 MMHG | WEIGHT: 199.3 LBS

## 2018-12-02 LAB
ALBUMIN SERPL-MCNC: 3.7 G/DL (ref 3.5–5.1)
ALP BLD-CCNC: 67 U/L (ref 38–126)
ALT SERPL-CCNC: 69 U/L (ref 11–66)
ANION GAP SERPL CALCULATED.3IONS-SCNC: 11 MEQ/L (ref 8–16)
AST SERPL-CCNC: 46 U/L (ref 5–40)
BILIRUB SERPL-MCNC: 0.9 MG/DL (ref 0.3–1.2)
BUN BLDV-MCNC: 13 MG/DL (ref 7–22)
CALCIUM SERPL-MCNC: 9.5 MG/DL (ref 8.5–10.5)
CHLORIDE BLD-SCNC: 102 MEQ/L (ref 98–111)
CO2: 23 MEQ/L (ref 23–33)
CREAT SERPL-MCNC: 1.1 MG/DL (ref 0.4–1.2)
GFR SERPL CREATININE-BSD FRML MDRD: 63 ML/MIN/1.73M2
GLUCOSE BLD-MCNC: 105 MG/DL (ref 70–108)
HCT VFR BLD CALC: 33.1 % (ref 42–52)
HEMOGLOBIN: 11.3 GM/DL (ref 14–18)
MAGNESIUM: 1.7 MG/DL (ref 1.6–2.4)
POTASSIUM SERPL-SCNC: 4.4 MEQ/L (ref 3.5–5.2)
SODIUM BLD-SCNC: 136 MEQ/L (ref 135–145)
TOTAL PROTEIN: 6.3 G/DL (ref 6.1–8)

## 2018-12-02 PROCEDURE — 83735 ASSAY OF MAGNESIUM: CPT

## 2018-12-02 PROCEDURE — 99239 HOSP IP/OBS DSCHRG MGMT >30: CPT | Performed by: FAMILY MEDICINE

## 2018-12-02 PROCEDURE — 85014 HEMATOCRIT: CPT

## 2018-12-02 PROCEDURE — 6370000000 HC RX 637 (ALT 250 FOR IP): Performed by: FAMILY MEDICINE

## 2018-12-02 PROCEDURE — 80053 COMPREHEN METABOLIC PANEL: CPT

## 2018-12-02 PROCEDURE — 2580000003 HC RX 258: Performed by: INTERNAL MEDICINE

## 2018-12-02 PROCEDURE — 99231 SBSQ HOSP IP/OBS SF/LOW 25: CPT | Performed by: PHYSICIAN ASSISTANT

## 2018-12-02 PROCEDURE — 36415 COLL VENOUS BLD VENIPUNCTURE: CPT

## 2018-12-02 PROCEDURE — 6370000000 HC RX 637 (ALT 250 FOR IP): Performed by: INTERNAL MEDICINE

## 2018-12-02 PROCEDURE — 85018 HEMOGLOBIN: CPT

## 2018-12-02 PROCEDURE — 73030 X-RAY EXAM OF SHOULDER: CPT

## 2018-12-02 PROCEDURE — 2709999900 HC NON-CHARGEABLE SUPPLY

## 2018-12-02 RX ORDER — NITROGLYCERIN 0.4 MG/1
TABLET SUBLINGUAL
Qty: 25 TABLET | Refills: 0 | Status: SHIPPED | OUTPATIENT
Start: 2018-12-02

## 2018-12-02 RX ORDER — ISOSORBIDE MONONITRATE 30 MG/1
30 TABLET, EXTENDED RELEASE ORAL DAILY
Qty: 30 TABLET | Refills: 0 | Status: SHIPPED | OUTPATIENT
Start: 2018-12-03

## 2018-12-02 RX ORDER — METOPROLOL SUCCINATE 25 MG/1
25 TABLET, EXTENDED RELEASE ORAL DAILY
Qty: 30 TABLET | Refills: 0 | Status: SHIPPED | OUTPATIENT
Start: 2018-12-03

## 2018-12-02 RX ORDER — ASPIRIN 81 MG/1
81 TABLET, CHEWABLE ORAL DAILY
Qty: 10 TABLET | Refills: 0 | Status: ON HOLD | COMMUNITY
Start: 2018-12-03 | End: 2020-08-17 | Stop reason: ALTCHOICE

## 2018-12-02 RX ORDER — AMIODARONE HYDROCHLORIDE 200 MG/1
200 TABLET ORAL DAILY
Qty: 30 TABLET | Refills: 0 | Status: SHIPPED | OUTPATIENT
Start: 2018-12-03

## 2018-12-02 RX ORDER — CLOPIDOGREL BISULFATE 75 MG/1
75 TABLET ORAL DAILY
Qty: 30 TABLET | Refills: 0 | Status: SHIPPED | OUTPATIENT
Start: 2018-12-03

## 2018-12-02 RX ADMIN — AMIODARONE HYDROCHLORIDE 200 MG: 200 TABLET ORAL at 08:46

## 2018-12-02 RX ADMIN — Medication 10 ML: at 08:46

## 2018-12-02 RX ADMIN — ISOSORBIDE MONONITRATE 30 MG: 30 TABLET ORAL at 08:46

## 2018-12-02 RX ADMIN — METOPROLOL SUCCINATE 25 MG: 25 TABLET, FILM COATED, EXTENDED RELEASE ORAL at 08:46

## 2018-12-02 RX ADMIN — CLOPIDOGREL BISULFATE 75 MG: 75 TABLET ORAL at 08:46

## 2018-12-02 RX ADMIN — BICALUTAMIDE 50 MG: 50 TABLET, FILM COATED ORAL at 08:46

## 2018-12-02 RX ADMIN — ASPIRIN 81 MG 81 MG: 81 TABLET ORAL at 08:46

## 2018-12-02 RX ADMIN — HYDROCODONE BITARTRATE AND ACETAMINOPHEN 1 TABLET: 7.5; 325 TABLET ORAL at 08:46

## 2018-12-02 RX ADMIN — LISINOPRIL 10 MG: 10 TABLET ORAL at 08:46

## 2018-12-02 RX ADMIN — APIXABAN 5 MG: 5 TABLET, FILM COATED ORAL at 08:46

## 2018-12-02 ASSESSMENT — PAIN SCALES - GENERAL
PAINLEVEL_OUTOF10: 5
PAINLEVEL_OUTOF10: 5

## 2018-12-02 ASSESSMENT — PAIN DESCRIPTION - PAIN TYPE: TYPE: CHRONIC PAIN

## 2018-12-02 NOTE — DISCHARGE SUMMARY
Dione Jiang MD (Interpreting   physician) on 11/29/2018 at 04:47 PM   ----------------------------------------------------------------    -- [facility] 11/29/18 --  Procedure Summary   Successful PCI / Drug Eluting Stent of the proximal Right Coronary Artery. Recommendations   Aspirin + Plavix   Resume Eliquis tomorrow   Discussed extensively about bleeding risks with patient and family   Continue Aspirin + Plavix + Eliquis for 2 weeks   D/C Aspirin in 2 weeks   Get 2D Echo   Monitor groin access and sheath pull per protocol      Estimated Blood Loss:10 ml. Complications:No complications.       Signatures      ----------------------------------------------------------------   Electronically signed by Aureliano Garcia MD (Performing   Physician) on 11/29/2018 at 17:52   ----------------------------------------------------------------      Consults:   IP CONSULT TO CARDIOLOGY  IP CONSULT TO CARDIAC REHAB  IP CONSULT TO DIETITIAN  IP CONSULT TO SOCIAL WORK      Examination:  Vitals:  Vitals:    12/01/18 2315 12/02/18 0438 12/02/18 0831 12/02/18 1126   BP: (!) 130/58 (!) 156/72 (!) 159/72 132/64   Pulse: 61 63 66 59   Resp: 18 16 16 18   Temp: 97.9 °F (36.6 °C) 98.3 °F (36.8 °C) 98.1 °F (36.7 °C) 97.9 °F (36.6 °C)   TempSrc: Oral Oral Oral Oral   SpO2: 92% 91% 95% 92%   Weight:  199 lb 4.8 oz (90.4 kg)     Height:         Weight: Weight: 199 lb 4.8 oz (90.4 kg)     24 hour intake/output:    Intake/Output Summary (Last 24 hours) at 12/02/18 1249  Last data filed at 12/01/18 2007   Gross per 24 hour   Intake              220 ml   Output                0 ml   Net              220 ml       General appearance - alert, well appearing, and in no distress and oriented to person, place, and time  Chest - clear to auscultation, no wheezes, rales or rhonchi, symmetric air entry, no tachypnea, retractions or cyanosis  Heart - irregularly irregular rhythm with rate controlled, systolic murmur 2/6 at 2nd left intercostal space and U/L    Alkaline Phosphatase 67 38 - 126 U/L    Total Protein 6.3 6.1 - 8.0 g/dL    Alb 3.7 3.5 - 5.1 g/dL    Total Bilirubin 0.9 0.3 - 1.2 mg/dL    ALT 69 (H) 11 - 66 U/L   Hemoglobin and Hematocrit, Blood    Collection Time: 12/02/18  7:34 AM   Result Value Ref Range    Hemoglobin 11.3 (L) 14.0 - 18.0 gm/dl    Hematocrit 33.1 (L) 42.0 - 52.0 %   Magnesium    Collection Time: 12/02/18  7:34 AM   Result Value Ref Range    Magnesium 1.7 1.6 - 2.4 mg/dL   Anion Gap    Collection Time: 12/02/18  7:34 AM   Result Value Ref Range    Anion Gap 11.0 8.0 - 16.0 meq/L   Glomerular Filtration Rate, Estimated    Collection Time: 12/02/18  7:34 AM   Result Value Ref Range    Est, Glom Filt Rate 63 (A) ml/min/1.73m2       Discharge condition: stable  Disposition: Home with [address] [place] [address] - to be set up by pcp per pt request  Time spent on discharge: 45 min    Electronically signed by Bhumika Willams MD on 12/2/2018 at 12:49 PM

## 2018-12-02 NOTE — PROGRESS NOTES
ER  25 mg Oral Daily    aspirin  81 mg Oral Daily         sodium chloride flush 10 mL PRN   acetaminophen 650 mg Q4H PRN   magnesium hydroxide 30 mL Daily PRN   ondansetron 4 mg Q6H PRN   HYDROcodone-acetaminophen 1 tablet Q6H PRN   fentanNYL 25 mcg Q2H PRN   melatonin 3 mg Nightly PRN   ondansetron 4 mg Q6H PRN       Diagnostics:  EKG:  NSR    Echo: 11/29/2018  Conclusions      Summary   Normal left ventricle size and mildly reduced systolic function. Ejection   fraction was estimated at 40%. There were no regional left ventricular   wall motion abnormalities and wall thickness was within normal limits.   Left atrial size was moderately dilated.   Right atrial size was severely dilated.  DESTINEY 1.2 cm2. There was evidence of mild to mod. aortic regurgitation. Cardiac catheterization 11/29/2018  PCI of RCA  Lab Data:    Cardiac Enzymes:  No results for input(s): CKTOTAL, CKMB, CKMBINDEX, TROPONINI in the last 72 hours.     CBC:   Lab Results   Component Value Date    WBC 9.1 12/01/2018    RBC 3.21 12/01/2018    HGB 11.3 12/02/2018    HCT 33.1 12/02/2018     12/01/2018       CMP:    Lab Results   Component Value Date     12/02/2018    K 4.4 12/02/2018    K 4.3 11/29/2018     12/02/2018    CO2 23 12/02/2018    BUN 13 12/02/2018    CREATININE 1.1 12/02/2018    LABGLOM 63 12/02/2018    GLUCOSE 105 12/02/2018    CALCIUM 9.5 12/02/2018       Hepatic Function Panel:    Lab Results   Component Value Date    ALKPHOS 67 12/02/2018    ALT 69 12/02/2018    AST 46 12/02/2018    PROT 6.3 12/02/2018    BILITOT 0.9 12/02/2018    LABALBU 3.7 12/02/2018       Magnesium:    Lab Results   Component Value Date    MG 1.7 12/02/2018       PT/INR:  No results found for: PROTIME, INR    HgBA1c:  No results found for: LABA1C    FLP:    Lab Results   Component Value Date    TRIG 38 11/29/2018    HDL 64 11/29/2018    LDLCALC 64 11/29/2018       TSH:    Lab Results   Component Value Date    TSH 2.350 12/01/2018

## 2018-12-04 ENCOUNTER — TELEPHONE (OUTPATIENT)
Dept: ADMINISTRATIVE | Age: 83
End: 2018-12-04

## 2018-12-04 LAB
BLOOD CULTURE, ROUTINE: NORMAL
BLOOD CULTURE, ROUTINE: NORMAL

## 2018-12-05 ENCOUNTER — TELEPHONE (OUTPATIENT)
Dept: ADMINISTRATIVE | Age: 83
End: 2018-12-05

## 2019-05-06 ENCOUNTER — OFFICE VISIT (OUTPATIENT)
Dept: PHYSICAL MEDICINE AND REHAB | Age: 84
End: 2019-05-06
Payer: MEDICARE

## 2019-05-06 VITALS
SYSTOLIC BLOOD PRESSURE: 107 MMHG | WEIGHT: 198.41 LBS | BODY MASS INDEX: 28.41 KG/M2 | HEART RATE: 67 BPM | HEIGHT: 70 IN | DIASTOLIC BLOOD PRESSURE: 63 MMHG

## 2019-05-06 DIAGNOSIS — G89.4 CHRONIC PAIN SYNDROME: ICD-10-CM

## 2019-05-06 DIAGNOSIS — M47.816 LUMBAR FACET ARTHROPATHY: ICD-10-CM

## 2019-05-06 DIAGNOSIS — M51.36 LUMBAR DEGENERATIVE DISC DISEASE: ICD-10-CM

## 2019-05-06 DIAGNOSIS — M48.061 LUMBAR STENOSIS WITHOUT NEUROGENIC CLAUDICATION: ICD-10-CM

## 2019-05-06 DIAGNOSIS — M47.816 SPONDYLOSIS OF LUMBAR REGION WITHOUT MYELOPATHY OR RADICULOPATHY: Primary | ICD-10-CM

## 2019-05-06 DIAGNOSIS — M47.816 LUMBAR SPONDYLOSIS: ICD-10-CM

## 2019-05-06 PROCEDURE — 1123F ACP DISCUSS/DSCN MKR DOCD: CPT | Performed by: NURSE PRACTITIONER

## 2019-05-06 PROCEDURE — G8598 ASA/ANTIPLAT THER USED: HCPCS | Performed by: NURSE PRACTITIONER

## 2019-05-06 PROCEDURE — 99213 OFFICE O/P EST LOW 20 MIN: CPT | Performed by: NURSE PRACTITIONER

## 2019-05-06 PROCEDURE — G8419 CALC BMI OUT NRM PARAM NOF/U: HCPCS | Performed by: NURSE PRACTITIONER

## 2019-05-06 PROCEDURE — G8427 DOCREV CUR MEDS BY ELIG CLIN: HCPCS | Performed by: NURSE PRACTITIONER

## 2019-05-06 PROCEDURE — 1036F TOBACCO NON-USER: CPT | Performed by: NURSE PRACTITIONER

## 2019-05-06 PROCEDURE — 4040F PNEUMOC VAC/ADMIN/RCVD: CPT | Performed by: NURSE PRACTITIONER

## 2019-05-06 ASSESSMENT — ENCOUNTER SYMPTOMS: BACK PAIN: 1

## 2019-05-06 NOTE — PROGRESS NOTES
[facility]  [address] [facility] [address]  Dept: [phone]  Dept Fax: [phone]  Loc: [phone]    Visit Date: 5/6/2019    Functionality Assessment/Goals Worksheet     On a scale of 0 (Does not Interfere) to 10 (Completely Interferes)     1. Which number describes how during the past week pain has interfered with       the following:  A. General Activity:  6  B. Mood: 8  C. Walking Ability:  7  D. Normal Work (Includes both work outside the home and housework):  5  E. Relations with Other People:   10  F. Sleep:   5  G. Enjoyment of Life:   9    2. Patient Prefers to Take their Pain Medications:     [x]  On a regular basis   []  Only when necessary    []  Does not take pain medications    3. What are the Patient's Goals/Expectations for Visiting Pain Management? []  Learn about my pain    []  Receive Medication   []  Physical Therapy     []  Treat Depression   [x]  Receive Injections    []  Treat Sleep   []  Deal with Anxiety and Stress   []  Treat Opoid Dependence/Addiction   []  Other: lower back pain       HPI:   Alicia Carlos is a 80 y.o. male is here today for    Chief Complaint: Lower back pain     HPI   6 month FU. Continues to have pain across lower back. Was unable to have bilateral L-facet RFA done in November of 2019 because of heart issues. States that he is clear now. Received over 80% relief from previous bilateral L-facet MBB # 2. Pain is aching across lower back. Increased with activity. Medications reviewed. Patient denies side effects with medications. Patient states he is taking medications as prescribed. Hedenies receiving pain medications from other sources. He had 2 ER visits since last visit . Pain scale with out pain medications or at its worst is 2-8/10. The patientis allergic to morphine.     Past Medical History  [place]  has a past medical history of Arthritis, CAD (coronary artery disease), Cancer ([facility] [address] Hypertension, and Irregular heart beat. Past Surgical History  The patient  has a past surgical history that includes hernia repair; joint replacement (Left); joint replacement (Left); Knee arthroscopy (Left); Colonoscopy; pr inj dx/ther agnt paravert facet joint, cerv/thorac, 2nd level (Bilateral, 8/23/2018); and pr inj dx/ther agnt paravert facet joint, cerv/thorac, 2nd level (Bilateral, 10/22/2018). Family History  This patient's family history is not on file. Social History  Jeanette Solitario  reports that he has never smoked. He has never used smokeless tobacco. He reports that he does not drink alcohol or use drugs. Medications    Current Outpatient Medications:     isosorbide mononitrate (IMDUR) 30 MG extended release tablet, Take 1 tablet by mouth daily, Disp: 30 tablet, Rfl: 0    amiodarone (CORDARONE) 200 MG tablet, Take 1 tablet by mouth daily, Disp: 30 tablet, Rfl: 0    apixaban (ELIQUIS) 5 MG TABS tablet, Take 1 tablet by mouth 2 times daily, Disp: 60 tablet, Rfl: 0    metoprolol succinate (TOPROL XL) 25 MG extended release tablet, Take 1 tablet by mouth daily, Disp: 30 tablet, Rfl: 0    clopidogrel (PLAVIX) 75 MG tablet, Take 1 tablet by mouth daily, Disp: 30 tablet, Rfl: 0    nitroGLYCERIN (NITROSTAT) 0.4 MG SL tablet, up to max of 3 total doses.  If no relief after 1 dose, call 911., Disp: 25 tablet, Rfl: 0    MISC NATURAL PRODUCTS PO, Take 1 tablet by mouth daily, Disp: , Rfl:     Omega-3 Fatty Acids (OMEGA-3 PLUS PO), Take 1 tablet by mouth daily, Disp: , Rfl:     MELATONIN PO, Take 1 tablet by mouth nightly, Disp: , Rfl:     NONFORMULARY, , Disp: , Rfl:     Multiple Vitamins-Minerals (MULTIVITAMIN ADULT PO), Take by mouth, Disp: , Rfl:     Mirabegron ER (MYRBETRIQ) 25 MG TB24, Myrbetriq 25 mg tablet,extended release daily, Disp: , Rfl:     atorvastatin (LIPITOR) 40 MG tablet, atorvastatin 40 mg tablet, Disp: , Rfl:    bicalutamide (CASODEX) 50 MG chemo tablet, Take 50 mg by mouth, Disp: , Rfl:     lisinopril (PRINIVIL;ZESTRIL) 10 MG tablet, lisinopril 10 mg tablet   1 tablet every day by oral route., Disp: , Rfl:     aspirin 81 MG chewable tablet, Take 1 tablet by mouth daily for 10 days, Disp: 10 tablet, Rfl: 0    Subjective:      Review of Systems   Musculoskeletal: Positive for arthralgias, back pain and myalgias. Neurological: Positive for weakness. Negative for numbness. Objective:     Vitals:    05/06/19 1041   BP: 107/63   Site: Right Upper Arm   Position: Sitting   Cuff Size: Medium Adult   Pulse: 67   Weight: 198 lb 6.6 oz (90 kg)   Height: 5' 10\" (1.778 m)       Physical Exam   Constitutional: He is oriented to person, place, and time. He appears well-developed and well-nourished. No distress. HENT:   Head: Normocephalic and atraumatic. Right Ear: External ear normal.   Left Ear: External ear normal.   Nose: Nose normal.   Mouth/Throat: Oropharynx is clear and moist. No oropharyngeal exudate. Eyes: Pupils are equal, round, and reactive to light. Conjunctivae and EOM are normal. Right eye exhibits no discharge. Left eye exhibits no discharge. No scleral icterus. Neck: Normal range of motion. Neck supple. No JVD present. No tracheal deviation present. No thyromegaly present. Cardiovascular: Normal rate, regular rhythm, normal heart sounds and intact distal pulses. Exam reveals no gallop and no friction rub. No murmur heard. Pulmonary/Chest: Effort normal and breath sounds normal. No stridor. No respiratory distress. He has no wheezes. He has no rales. Abdominal: Soft. Bowel sounds are normal. He exhibits no distension. There is no tenderness. There is no rebound and no guarding. Musculoskeletal: He exhibits tenderness. He exhibits no edema. Right shoulder: He exhibits normal range of motion. Left shoulder: He exhibits decreased range of motion.         Right elbow: He exhibits Spondylosis of lumbar region without myelopathy or radiculopathy    2. Lumbar spondylosis    3. Lumbar facet arthropathy    4. Lumbar degenerative disc disease    5. Lumbar stenosis without neurogenic claudication    6. Chronic pain syndrome            Plan:      · OARRS reviewed. Current MED: 0  · Patient was not offered naloxone for home. · Discussed long term side effects of medications, tolerance, dependency and addiction. · Previous UDS reviewed  · UDS preformed today for compliance. · Patient told can not receive any pain medications from any other source. · No evidence of abuse, diversion or aberrant behavior.  Medications and/or procedures to improve function and quality of life- patient understanding with this and that may not be pain free   Discussed with patient about safe storage of medications at home   Discussed possible weaning of medication dosing dependent on treatment/procedure results.  Discussed with patient about risks with procedure including infection, reaction to medication, increased pain, or bleeding.  Plan Bilateral L-facet RFA @ L3-4, L4-5, and L5-S1 LEFT SIDE FIRST. For longer term pain relief. Procedure and risks discussed in detail with patient.  Patient on Plavix and Eliquis needs to be cleared by Cardiology prior to procedure. Meds. Prescribed:   No orders of the defined types were placed in this encounter. Return for  Bilateral L-facet RFA @ L3-4, L4-5, and L5-S1 LEFT SIDE FIRST. , follow up after procedure.          Electronically signed by KAREEM Novoa CNP on5/6/2019 at 10:58 AM

## 2019-05-08 ENCOUNTER — TELEPHONE (OUTPATIENT)
Dept: PHYSICAL MEDICINE AND REHAB | Age: 84
End: 2019-05-08

## 2019-05-08 NOTE — TELEPHONE ENCOUNTER
Per Dr. Julienne Meza, Pt cannot be off Plavix and/or Eliquis until November 2019 due to having stents placed for CAD in November 2018. Denial in Media tab. How would you like to proceed?

## 2019-06-07 ENCOUNTER — TELEPHONE (OUTPATIENT)
Dept: PHYSICAL MEDICINE AND REHAB | Age: 84
End: 2019-06-07

## 2019-06-07 NOTE — TELEPHONE ENCOUNTER
Pt asking when his next procedure with Dr Gio Pelaez is scheduled, but I didn't see one.   Please call him at [phone]

## 2019-06-10 NOTE — TELEPHONE ENCOUNTER
Per previous encounter with Gerry eMza, pt is not able to hold thinners till 11/2019. Made office visit for October to re-evaluate and get clearance then.  Pt aware

## 2019-06-26 ENCOUNTER — TELEPHONE (OUTPATIENT)
Dept: PHYSICAL MEDICINE AND REHAB | Age: 84
End: 2019-06-26

## 2019-06-26 NOTE — TELEPHONE ENCOUNTER
Called patient to let him know we still need to wait until November to proceed with procedures d/t Dr. Kim Olson not clearing pt to hold eliquis or plavix. His next appt with us is on 10/28/2019 to discuss further.

## 2020-05-14 ENCOUNTER — VIRTUAL VISIT (OUTPATIENT)
Dept: PHYSICAL MEDICINE AND REHAB | Age: 85
End: 2020-05-14
Payer: MEDICARE

## 2020-05-14 PROCEDURE — 99443 PR PHYS/QHP TELEPHONE EVALUATION 21-30 MIN: CPT | Performed by: NURSE PRACTITIONER

## 2020-05-14 NOTE — PROGRESS NOTES
Mynor Alba is a 80 y.o. male evaluated via telephone on 5/14/2020. Telephone Visit Encounter  Has not been seen since May 2019  Chief Complaint: lower back pain       Consent:  He and/or health care decision maker is aware that that he may receive a bill for this telephone service, depending on his insurance coverage, and has provided verbal consent to proceed: Yes      Documentation:  I communicated with the patient and/or health care decision maker about lower back pain, blood thinners, procedures. Patient has not been seen since May 2019 because he at that time was nyt cleared by cardiologist to be off blood thinners to move forward medically with procedures. It has been about 1.5 years since stent was placed and patient reports that he is now cleared for procedures. Remains on Plavix and Eliquis. Main complaint remains pain across lower back. Aching pain and patient is only able to tolerate 15 minutes of activity at a time. Received great relief of low back pain 80% relief short term from L-facet MBB X 2. Patient would like to move forward with bilateral L-facet RFA. Denies no new cardiac issues or hospitalizations in the past year. Taking tylenol prn for pain. Pain level is 3-8/10. I affirm this is a Patient Initiated Episode with a Patient who has not had a related appointment within my department in the past 7 days or scheduled within the next 24 hours. Patient identification was verified at the start of the visit: Yes    - Has been 1.5 years since stent placed and states his Cardiologist gave okay for procedure. - Received over 80% relief from L-facet MBB X 2 in past.   - Plan Bilateral L-facet RFA @ L3-4, L4-5, and L5-S1 LEFT SIDE FIRST for longer term pain relief. Procedure and risks discussed in detail with patient. - Patient on Plavix and Eliquis needs to be cleared by Cardiology prior to procedure.    - Will follow up after procedure    Total Time: minutes: 21-30 minutes    Note: not billable if this call serves to triage the patient into an appointment for the relevant concern      Bharath Miller

## 2020-06-29 ENCOUNTER — TELEPHONE (OUTPATIENT)
Dept: PHYSICAL MEDICINE AND REHAB | Age: 85
End: 2020-06-29

## 2020-07-01 ENCOUNTER — TELEPHONE (OUTPATIENT)
Dept: PHYSICAL MEDICINE AND REHAB | Age: 85
End: 2020-07-01

## 2020-07-01 NOTE — TELEPHONE ENCOUNTER
Pt called stating Dr. Dionne Cee office was supposed to send signed clearance for procedures. Spoke with office staff and they are refaxing to [phone].

## 2020-07-15 ENCOUNTER — HOSPITAL ENCOUNTER (OUTPATIENT)
Age: 85
Discharge: HOME | End: 2020-07-15
Payer: MEDICARE

## 2020-07-15 PROCEDURE — U0003 INFECTIOUS AGENT DETECTION BY NUCLEIC ACID (DNA OR RNA); SEVERE ACUTE RESPIRATORY SYNDROME CORONAVIRUS 2 (SARS-COV-2) (CORONAVIRUS DISEASE [COVID-19]), AMPLIFIED PROBE TECHNIQUE, MAKING USE OF HIGH THROUGHPUT TECHNOLOGIES AS DESCRIBED BY CMS-2020-01-R: HCPCS

## 2020-07-16 LAB — SARS-COV-2: NOT DETECTED

## 2020-07-20 ENCOUNTER — ANESTHESIA (OUTPATIENT)
Dept: OPERATING ROOM | Age: 85
End: 2020-07-20
Payer: MEDICARE

## 2020-07-20 ENCOUNTER — HOSPITAL ENCOUNTER (OUTPATIENT)
Age: 85
Setting detail: OUTPATIENT SURGERY
Discharge: HOME | End: 2020-07-20
Attending: PAIN MEDICINE | Admitting: PAIN MEDICINE
Payer: MEDICARE

## 2020-07-20 ENCOUNTER — ANESTHESIA EVENT (OUTPATIENT)
Dept: OPERATING ROOM | Age: 85
End: 2020-07-20
Payer: MEDICARE

## 2020-07-20 ENCOUNTER — TELEPHONE (OUTPATIENT)
Dept: PHYSICAL MEDICINE AND REHAB | Age: 85
End: 2020-07-20

## 2020-07-20 ENCOUNTER — APPOINTMENT (OUTPATIENT)
Dept: GENERAL RADIOLOGY | Age: 85
End: 2020-07-20
Attending: PAIN MEDICINE
Payer: MEDICARE

## 2020-07-20 VITALS
HEIGHT: 71 IN | RESPIRATION RATE: 12 BRPM | OXYGEN SATURATION: 95 % | SYSTOLIC BLOOD PRESSURE: 91 MMHG | HEART RATE: 70 BPM | DIASTOLIC BLOOD PRESSURE: 52 MMHG | TEMPERATURE: 97.5 F | BODY MASS INDEX: 25.59 KG/M2 | WEIGHT: 182.8 LBS

## 2020-07-20 VITALS
OXYGEN SATURATION: 100 % | RESPIRATION RATE: 8 BRPM | DIASTOLIC BLOOD PRESSURE: 91 MMHG | SYSTOLIC BLOOD PRESSURE: 185 MMHG

## 2020-07-20 PROCEDURE — 2720000010 HC SURG SUPPLY STERILE: Performed by: PAIN MEDICINE

## 2020-07-20 PROCEDURE — 2500000003 HC RX 250 WO HCPCS: Performed by: PAIN MEDICINE

## 2020-07-20 PROCEDURE — 6360000002 HC RX W HCPCS: Performed by: PAIN MEDICINE

## 2020-07-20 PROCEDURE — 7100000011 HC PHASE II RECOVERY - ADDTL 15 MIN: Performed by: PAIN MEDICINE

## 2020-07-20 PROCEDURE — 64636 DESTROY L/S FACET JNT ADDL: CPT | Performed by: PAIN MEDICINE

## 2020-07-20 PROCEDURE — 3700000000 HC ANESTHESIA ATTENDED CARE: Performed by: PAIN MEDICINE

## 2020-07-20 PROCEDURE — 7100000010 HC PHASE II RECOVERY - FIRST 15 MIN: Performed by: PAIN MEDICINE

## 2020-07-20 PROCEDURE — 2709999900 HC NON-CHARGEABLE SUPPLY: Performed by: PAIN MEDICINE

## 2020-07-20 PROCEDURE — 3600000056 HC PAIN LEVEL 4 BASE: Performed by: PAIN MEDICINE

## 2020-07-20 PROCEDURE — 6360000002 HC RX W HCPCS: Performed by: NURSE ANESTHETIST, CERTIFIED REGISTERED

## 2020-07-20 PROCEDURE — 3209999900 FLUORO FOR SURGICAL PROCEDURES

## 2020-07-20 PROCEDURE — 64635 DESTROY LUMB/SAC FACET JNT: CPT | Performed by: PAIN MEDICINE

## 2020-07-20 RX ORDER — BUPIVACAINE HYDROCHLORIDE 2.5 MG/ML
INJECTION, SOLUTION EPIDURAL; INFILTRATION; INTRACAUDAL PRN
Status: DISCONTINUED | OUTPATIENT
Start: 2020-07-20 | End: 2020-07-20 | Stop reason: ALTCHOICE

## 2020-07-20 RX ORDER — PROPOFOL 10 MG/ML
INJECTION, EMULSION INTRAVENOUS PRN
Status: DISCONTINUED | OUTPATIENT
Start: 2020-07-20 | End: 2020-07-20 | Stop reason: SDUPTHER

## 2020-07-20 RX ORDER — FENTANYL CITRATE 50 UG/ML
INJECTION, SOLUTION INTRAMUSCULAR; INTRAVENOUS PRN
Status: DISCONTINUED | OUTPATIENT
Start: 2020-07-20 | End: 2020-07-20 | Stop reason: SDUPTHER

## 2020-07-20 RX ORDER — METHYLPREDNISOLONE ACETATE 80 MG/ML
INJECTION, SUSPENSION INTRA-ARTICULAR; INTRALESIONAL; INTRAMUSCULAR; SOFT TISSUE PRN
Status: DISCONTINUED | OUTPATIENT
Start: 2020-07-20 | End: 2020-07-20 | Stop reason: ALTCHOICE

## 2020-07-20 RX ORDER — LIDOCAINE HYDROCHLORIDE 10 MG/ML
INJECTION, SOLUTION EPIDURAL; INFILTRATION; INTRACAUDAL; PERINEURAL PRN
Status: DISCONTINUED | OUTPATIENT
Start: 2020-07-20 | End: 2020-07-20 | Stop reason: ALTCHOICE

## 2020-07-20 RX ADMIN — FENTANYL CITRATE 100 MCG: 50 INJECTION, SOLUTION INTRAMUSCULAR; INTRAVENOUS at 10:56

## 2020-07-20 RX ADMIN — PROPOFOL 50 MG: 10 INJECTION, EMULSION INTRAVENOUS at 11:00

## 2020-07-20 ASSESSMENT — PULMONARY FUNCTION TESTS
PIF_VALUE: 0

## 2020-07-20 ASSESSMENT — PAIN - FUNCTIONAL ASSESSMENT: PAIN_FUNCTIONAL_ASSESSMENT: 0-10

## 2020-07-20 ASSESSMENT — PAIN SCALES - GENERAL: PAINLEVEL_OUTOF10: 0

## 2020-07-20 NOTE — H&P
[facility]  History and Physical Update    Pt Name: Caty Kim  MRN: [id number]  YOB: 1927  Date of evaluation: 7/20/2020      I have examined the patient and reviewed the H&P/Consult and there are no changes to the patient or plans.         Electronically signed by Eliu Juarez MD on 7/20/2020 at 10:15 AM

## 2020-07-20 NOTE — OP NOTE
Operative Note  Pre-Procedure Note    Patient Name: Gris Dickson   YOB: 1927  Medical Record Number: [id number]  Date: 7/20/20    Indication: Lower back pain  Consent: On file. Vital Signs:   Vitals:    07/20/20 1008   BP: (!) 160/70   Pulse: 79   Resp: 16   Temp: 97.2 °F (36.2 °C)   SpO2: 99%       Past Medical History:   has a past medical history of Arthritis, CAD (coronary artery disease), Cancer ([address] Hypertension, and Irregular heart beat. Past Surgical History:   has a past surgical history that includes hernia repair; joint replacement (Left); joint replacement (Left); Knee arthroscopy (Left); Colonoscopy; pr inj dx/ther agnt paravert facet joint, cerv/thorac, 2nd level (Bilateral, 8/23/2018); and pr inj dx/ther agnt paravert facet joint, cerv/thorac, 2nd level (Bilateral, 10/22/2018). Pre-Sedation Documentation and Exam:   Vital signs have been reviewed (see flow sheet for vitals). Sedation/ Anesthesia Plan:   MAC    Patient is an appropriate candidate for plan of sedation: yes    Preoperative Diagnosis: L-spondylosis    Post-Op Dx: as above    Procedure Performed:  :Radiofrequency ablation of median branches at the levels of L3-4,L4-5 and L5-S1 left under fluoroscopic guidance     Indication for the Procedure: The patient has ahistory of chronic low back pain that is not responding well to the conservative treatment. Patient's pain is mostly axial in nature. Pain is interfering with the activities of daily living. Physical examination revealed facet tenderness and facet loading is positive. Patient had undergone lumbar facet joint injections with pain relief that lasted for only a short period of time and had greater than 70% pain relief with confirmatory median branch blocks. Hence we decided to do radiofrequency abalation of median branches for long term pain releif.    The procedure and risks  were discussed with the patient and an informed consent was obtained. Procedure:  Left   A meaningful communication was kept up with the patient throughout the procedure. The patient is placed in prone position and skin over the back was prepped and draped in sterile manner. Then using fluoroscopy the junction of the transverse process of the vertebra with the superior process of the facet joint was observed and the view was optimized. The skin and deep tissues posterior were infiltrated with 9 ml of  1% xylocaine The RF canula with the 10 mm active tip was introduced through the skin wheal under fluoroscopy guidance such that the tip of the needle lies in the groove of the transverse process with the superior processes of the facet joint. Then a lateral view of the lumbar spine was obtained to make sure the tip of needle is not in the neural foramen. Then electric impedence was checked to make sure it is acceptable. Then a sensory stimulus was applied at 50 Hz up to 1 volt and concordant pain symptoms were reproduced. Then a motor stimulus was applied at 2 Hz up to 2 volts and no motor stimulation was seen in lower extremities. Some multifidus stimulus was seen. Then after negative aspiration a mixture of depomedrol 80 mg  and 0.25%  Marcaine  1.5 cc was injected through the needle. Then a  lesion was done at 80 degrees centigrade for 90 seconds. For L5 median branch block the junction of the ala of  the sacrum with the superior articular process of the facet joint was taken as a reference point. For the L4 median branch the junction of the transverse process of L5 with the superolateral possible facet joint was taken as a reference point and for S1 median branch the most lateral and superior aspect of S1 foramina was taken as a reference point,. For L3 median branch the junction of L4 transverse process and superior articular process of facet joint was taken as reference point and so on.   EBL-0  Patient's vital signs and neurological status remained stable throughout the procedure and post procedural period. The patient tolerated the procedure well and was discharged home in stable condition.     Electronically signed by Vipul Cuevas MD on 7/20/20 at 10:55 AM EDT

## 2020-07-20 NOTE — ANESTHESIA PRE PROCEDURE
(PRINIVIL;ZESTRIL) 10 MG tablet lisinopril 10 mg tablet    1 tablet every day by oral route. Historical Provider, MD       Current medications:    No current facility-administered medications for this encounter. Allergies:     Allergies   Allergen Reactions    Morphine Itching       Problem List:    Patient Active Problem List   Diagnosis Code    Lumbar spondylosis M47.816    Cardiac arrest ([facility] [address] I46.9    Chest pain R07.9    Hyperlipidemia E78.5    CAD (coronary artery disease) I25.10    Leukocytosis D72.829    S/P angioplasty with stent Z95.820    Ventricular tachycardia (HCC) I47.2    PAF (paroxysmal atrial fibrillation) ([facility]) I48.0    Hyponatremia A44.1    Metabolic acidosis C86.2    Elevated LFTs R94.5    Right shoulder pain M25.511    Chronic anticoagulation Z79.01    Normocytic anemia D64.9    Essential hypertension I10    Moderate aortic stenosis I35.0    Prostate cancer ([facility]) C61    Chronic back pain greater than 3 months duration M54.9, G89.29    Physical deconditioning R53.81       Past Medical History:        Diagnosis Date    Arthritis     CAD (coronary artery disease)     [facility] ([email or web address]     PROSTATE    Hypertension     Irregular heart beat        Past Surgical History:        Procedure Laterality Date    COLONOSCOPY      HERNIA REPAIR      JOINT REPLACEMENT Left     HIP    JOINT REPLACEMENT Left     SHOULDER    KNEE ARTHROSCOPY Left     MS INJ DX/THER AGNT PARAVERT FACET JOINT, CERV/THORAC, 2ND LEVEL Bilateral 8/23/2018    LUMBAR FACET Bilateral L-facet MBB @ L3-4, L4-5, L5-S1. performed by Loree Alvares MD at [facility] DX/THER AGNT PARAVERT FACET JOINT, CERV/THORAC, 2ND LEVEL Bilateral 10/22/2018    Lumbar Facet MBB [employer] Bilateral performed by Loree Alvares MD at [address] [place] [address]       Social History:    Social History     Tobacco Use    Smoking status: Never Smoker    Smokeless tobacco: Never Used Applicable):  No results found for: HIV, HEPCAB    COVID-19 Screening (If Applicable):   Lab Results   Component Value Date    COVID19 Not Detected 07/15/2020         Anesthesia Evaluation    Airway: Mallampati: II       Mouth opening: > = 3 FB Dental:          Pulmonary:                              Cardiovascular:    (+) hypertension:, CAD:, dysrhythmias:,                   Neuro/Psych:               GI/Hepatic/Renal:             Endo/Other:                     Abdominal:           Vascular:                                        Anesthesia Plan      MAC     ASA 4             Anesthetic plan and risks discussed with patient. Plan discussed with CRNA.                   Rolando Benz MD   7/20/2020

## 2020-07-20 NOTE — ANESTHESIA POSTPROCEDURE EVALUATION
Department of Anesthesiology  Postprocedure Note    Patient: Arianna Goodman  MRN: [id number]  YOB: 1927  Date of evaluation: 7/20/2020  Time:  12:57 PM     Procedure Summary     Date:  07/20/20 Room / Location:  [address] [place] [address] 03 / [facility]    Anesthesia Start:  5915 Anesthesia Stop:  0085    Procedure:  Bilateral L-facet RFA @ L3-4, L4-5, and L5-S1 LEFT SIDE FIRST (Left ) Diagnosis:  (LUMBAR SPONDYLOSIS)    Surgeon:  Anibal Burgess MD Responsible Provider:  Carolan Meckel, MD    Anesthesia Type:  MAC ASA Status:  4          Anesthesia Type: MAC    Thierno Phase I:      Thierno Phase II: Thierno Score: 9    Last vitals: Reviewed and per EMR flowsheets.        Anesthesia Post Evaluation

## 2020-07-20 NOTE — PROGRESS NOTES
Resting quietly in bed with call light in reach. Reviewed discharge instructions   And voiced understanding.

## 2020-07-20 NOTE — PROGRESS NOTES
1105-  Patient arrived to phase II via cart. Spontaneous respirations even and unlabored. Placed on monitor--BP low-- will monitor. All other VSS. Report received from [facility]. 1106-  Assessment completed. Patient is drowsy at this time and non-responsive. IV capped off-- no complications. Injection sites clean and dry. 1110-  Patient remains sleeping. 1115-  /55.    1120-  Patient starting to wake up. Patient denies pain--will monitor. 1125-  Snack and drink given to patient. Family updated. 1130-  IV removed-- no complications. Bandage applied. 1135-  Patient dressing. 1139-  Patient discharged in stable condition with all belongings. This RN walked patient to car.

## 2020-07-20 NOTE — H&P
H & P    Patient has not been seen since May 2019 because he at that time was nyt cleared by cardiologist to be off blood thinners to move forward medically with procedures. It has been about 1.5 years since stent was placed and patient reports that he is now cleared for procedures. Remains on Plavix and Eliquis.      Main complaint remains pain across lower back. Aching pain and patient is only able to tolerate 15 minutes of activity at a time.      Received great relief of low back pain 80% relief short term from L-facet MBB X 2. Patient would like to move forward with bilateral L-facet RFA. Denies no new cardiac issues or hospitalizations in the past year.      Taking tylenol prn for pain. Pain level is 3-8/10.     I affirm this is a Patient Initiated Episode with a Patient who has not had a related appointment within my department in the past 7 days or scheduled within the next 24 hours.     Patient identification was verified at the start of the visit: Yes     - Has been 1.5 years since stent placed and states his Cardiologist gave okay for procedure. - Received over 80% relief from L-facet MBB X 2 in past.   - Plan Bilateral L-facet RFA @ L3-4, L4-5, and L5-S1 LEFT SIDE FIRST for longer term pain relief. Procedure and risks discussed in detail with patient.     - Patient on Plavix and Eliquis needs to be cleared by Cardiology prior to procedure. Cleared  - Will follow up after procedure

## 2020-08-03 ENCOUNTER — OFFICE VISIT (OUTPATIENT)
Dept: PHYSICAL MEDICINE AND REHAB | Age: 85
End: 2020-08-03
Payer: MEDICARE

## 2020-08-03 VITALS
TEMPERATURE: 97.3 F | HEIGHT: 71 IN | WEIGHT: 182 LBS | DIASTOLIC BLOOD PRESSURE: 66 MMHG | BODY MASS INDEX: 25.48 KG/M2 | SYSTOLIC BLOOD PRESSURE: 118 MMHG

## 2020-08-03 PROCEDURE — G8417 CALC BMI ABV UP PARAM F/U: HCPCS | Performed by: NURSE PRACTITIONER

## 2020-08-03 PROCEDURE — 1036F TOBACCO NON-USER: CPT | Performed by: NURSE PRACTITIONER

## 2020-08-03 PROCEDURE — 1123F ACP DISCUSS/DSCN MKR DOCD: CPT | Performed by: NURSE PRACTITIONER

## 2020-08-03 PROCEDURE — G8427 DOCREV CUR MEDS BY ELIG CLIN: HCPCS | Performed by: NURSE PRACTITIONER

## 2020-08-03 PROCEDURE — 99213 OFFICE O/P EST LOW 20 MIN: CPT | Performed by: NURSE PRACTITIONER

## 2020-08-03 PROCEDURE — 4040F PNEUMOC VAC/ADMIN/RCVD: CPT | Performed by: NURSE PRACTITIONER

## 2020-08-03 ASSESSMENT — ENCOUNTER SYMPTOMS: BACK PAIN: 1

## 2020-08-03 NOTE — PROGRESS NOTES
[facility]  [address]  Dept: [phone]  Dept Fax: [phone]: [phone]    Visit Date: 8/3/2020    Functionality Assessment/Goals Worksheet     On a scale of 0 (Does not Interfere) to 10 (Completely Interferes)     1. Which number describes how during the past week pain has interfered with       the following:  A. General Activity:  5  B. Mood: 9  C. Walking Ability:  5  D. Normal Work (Includes both work outside the home and housework):  4  E. Relations with Other People:   10  F. Sleep:   9  G. Enjoyment of Life:   10    2. Patient Prefers to Take their Pain Medications:     [x]  On a regular basis   []  Only when necessary    []  Does not take pain medications    3. What are the Patient's Goals/Expectations for Visiting Pain Management? [x]  Learn about my pain    []  Receive Medication   [x]  Physical Therapy     []  Treat Depression   []  Receive Injections    []  Treat Sleep   []  Deal with Anxiety and Stress   []  Treat Opoid Dependence/Addiction   [x]  Other: any restrictions       HPI:   Jacob Apple is a 80 y.o. male is here today for    Chief Complaint: Lower back pain     HPI   Left L-facet RFA from 7/20/2020. Receiving 75% relief of left lower back pain from Left L-facet RFA. \"My quality of life has improved, I can go longer and have more flexibility\". I was able to tend myy garden\". Main complaint is right lower back pain- aching       Medications reviewed. Patient denies side effects with medications. Patient states he is taking medications as prescribed. Hedenies receiving pain medications from other sources. He denies any ER visits since last visit. Pain scale with out pain medications or at its worst is 2-8/10. The patientis allergic to morphine.     Past Medical History  Radha Mendez  has a past medical history of Arthritis, CAD (coronary artery disease), [facility] Hypertension, and Irregular heart beat. Past Surgical History  The patient  has a past surgical history that includes hernia repair; joint replacement (Left); joint replacement (Left); Knee arthroscopy (Left); Colonoscopy; pr inj dx/ther agnt paravert facet joint, cerv/thorac, 2nd level (Bilateral, 8/23/2018); pr inj dx/ther agnt paravert facet joint, cerv/thorac, 2nd level (Bilateral, 10/22/2018); and Pain management procedure (Left, 7/20/2020). Family History  This patient's family history is not on file. Social History  Moi Martínez  reports that he has never smoked. He has never used smokeless tobacco. He reports that he does not drink alcohol or use drugs. Medications    Current Outpatient Medications:     isosorbide mononitrate (IMDUR) 30 MG extended release tablet, Take 1 tablet by mouth daily, Disp: 30 tablet, Rfl: 0    amiodarone (CORDARONE) 200 MG tablet, Take 1 tablet by mouth daily, Disp: 30 tablet, Rfl: 0    apixaban (ELIQUIS) 5 MG TABS tablet, Take 1 tablet by mouth 2 times daily, Disp: 60 tablet, Rfl: 0    metoprolol succinate (TOPROL XL) 25 MG extended release tablet, Take 1 tablet by mouth daily, Disp: 30 tablet, Rfl: 0    clopidogrel (PLAVIX) 75 MG tablet, Take 1 tablet by mouth daily, Disp: 30 tablet, Rfl: 0    nitroGLYCERIN (NITROSTAT) 0.4 MG SL tablet, up to max of 3 total doses.  If no relief after 1 dose, call 911., Disp: 25 tablet, Rfl: 0    MISC NATURAL PRODUCTS PO, Take 1 tablet by mouth daily, Disp: , Rfl:     Omega-3 Fatty Acids (OMEGA-3 PLUS PO), Take 1 tablet by mouth daily, Disp: , Rfl:     MELATONIN PO, Take 1 tablet by mouth nightly, Disp: , Rfl:     NONFORMULARY, , Disp: , Rfl:     Multiple Vitamins-Minerals (MULTIVITAMIN ADULT PO), Take by mouth, Disp: , Rfl:     Mirabegron ER (MYRBETRIQ) 25 MG TB24, Myrbetriq 25 mg tablet,extended release daily, Disp: , Rfl:     atorvastatin (LIPITOR) 40 MG tablet, atorvastatin 40 mg tablet, Disp: , Rfl:   bicalutamide (CASODEX) 50 MG chemo tablet, Take 50 mg by mouth, Disp: , Rfl:     lisinopril (PRINIVIL;ZESTRIL) 10 MG tablet, lisinopril 10 mg tablet   1 tablet every day by oral route., Disp: , Rfl:     aspirin 81 MG chewable tablet, Take 1 tablet by mouth daily for 10 days, Disp: 10 tablet, Rfl: 0    Subjective:      Review of Systems   Constitutional: Negative. HENT: Negative. Musculoskeletal: Positive for arthralgias, back pain, gait problem and myalgias. Skin: Negative. Neurological: Positive for weakness. Psychiatric/Behavioral: Negative. Objective:     Vitals:    08/03/20 0921   BP: 118/66   Temp: 97.3 °F (36.3 °C)   Weight: 182 lb (82.6 kg)   Height: 5' 11\" (1.803 m)       Physical Exam  Constitutional:       General: He is not in acute distress. Appearance: He is well-developed. He is not diaphoretic. HENT:      Head: Normocephalic and atraumatic. Right Ear: External ear normal.      Left Ear: External ear normal.      Nose: Nose normal.      Mouth/Throat:      Pharynx: No oropharyngeal exudate. Eyes:      General: No scleral icterus. Right eye: No discharge. Left eye: No discharge. Conjunctiva/sclera: Conjunctivae normal.      Pupils: Pupils are equal, round, and reactive to light. Neck:      Musculoskeletal: Normal range of motion and neck supple. Thyroid: No thyromegaly. Vascular: No JVD. Trachea: No tracheal deviation. Cardiovascular:      Rate and Rhythm: Normal rate and regular rhythm. Heart sounds: Normal heart sounds. No murmur. No friction rub. No gallop. Pulmonary:      Effort: Pulmonary effort is normal. No respiratory distress. Breath sounds: Normal breath sounds. No stridor. No wheezing or rales. Abdominal:      General: Bowel sounds are normal. There is no distension. Palpations: Abdomen is soft. Tenderness: There is no abdominal tenderness. There is no guarding or rebound.    Musculoskeletal: General: Tenderness present. Right shoulder: He exhibits normal range of motion. Left shoulder: He exhibits decreased range of motion. Right elbow: He exhibits normal range of motion. Left elbow: He exhibits normal range of motion. Right hip: He exhibits normal range of motion. Left hip: He exhibits normal range of motion. Right knee: No tenderness found. Left knee: Tenderness found. Right ankle: No tenderness. Left ankle: No tenderness. Cervical back: He exhibits normal range of motion. Thoracic back: He exhibits normal range of motion. Lumbar back: He exhibits decreased range of motion, tenderness, bony tenderness and pain. Back:       Right upper arm: He exhibits no tenderness. Left upper arm: He exhibits no tenderness. Right forearm: He exhibits no tenderness. Left forearm: He exhibits no tenderness. Right upper leg: He exhibits no tenderness. Left upper leg: He exhibits no tenderness. Right lower leg: He exhibits no tenderness. Left lower leg: He exhibits no tenderness. Right foot: Normal range of motion. Left foot: Normal range of motion. Lymphadenopathy:      Cervical: No cervical adenopathy. Skin:     General: Skin is warm and dry. Neurological:      Mental Status: He is alert and oriented to person, place, and time. Cranial Nerves: No cranial nerve deficit. Sensory: No sensory deficit. Motor: No abnormal muscle tone. Coordination: Coordination normal.      Deep Tendon Reflexes: Reflexes are normal and symmetric. Reflexes normal.      Reflex Scores:       Tricep reflexes are 2+ on the right side and 2+ on the left side. Bicep reflexes are 2+ on the right side and 2+ on the left side. Brachioradialis reflexes are 2+ on the right side and 2+ on the left side. Patellar reflexes are 2+ on the right side and 2+ on the left side.        Achilles reflexes are 2+ on the right side and 2+ on the left side. Comments: SLR negative 90 degrees       EFRA test: negative   Yeomans test: negative   Gaenslen test: negative      Assessment:     1. Spondylosis of lumbar region without myelopathy or radiculopathy    2. Lumbar facet arthropathy    3. Chronic bilateral low back pain without sciatica    4. Lumbar degenerative disc disease    5. Spinal stenosis of lumbar region without neurogenic claudication    6. Chronic pain syndrome            Plan:      · OARRS reviewed. Current MED: 0  · Patient was not offered naloxone for home. · Discussed long term side effects of medications, tolerance, dependency and addiction. · Previous UDS reviewed  · UDS preformed today for compliance. · Patient told can not receive any pain medications from any other source. · No evidence of abuse, diversion or aberrant behavior.  Medications and/or procedures to improve function and quality of life- patient understanding with this and that may not be pain free   Discussed with patient about safe storage of medications at home   Discussed possible weaning of medication dosing dependent on treatment/procedure results.  Discussed with patient about risks with procedure including infection, reaction to medication, increased pain, or bleeding.  Procedure notes reviewed in detail    Receiving 75% relief of left lower back pain from left L-facet RFA. Right lower back pain is the main complaint today.  Plan Right L-facet RFA @ L3-4, L4-5, and L5-S1 for longer term pain relief. Procedure and risks discussed in detail with patient. · Patient on Plavix and Eliquis needs to be cleared to hold by Cardiology prior to procedure. Meds. Prescribed:   No orders of the defined types were placed in this encounter. Return for Right L-facet RFA @ L3-4, L4-5, and L5-S1. , follow up after procedure.        Electronically signed by KAREEM Ozuna CNP on8/3/2020 at 9:45 AM

## 2020-08-11 ENCOUNTER — HOSPITAL ENCOUNTER (OUTPATIENT)
Age: 85
Discharge: HOME | End: 2020-08-11
Payer: MEDICARE

## 2020-08-11 PROCEDURE — U0003 INFECTIOUS AGENT DETECTION BY NUCLEIC ACID (DNA OR RNA); SEVERE ACUTE RESPIRATORY SYNDROME CORONAVIRUS 2 (SARS-COV-2) (CORONAVIRUS DISEASE [COVID-19]), AMPLIFIED PROBE TECHNIQUE, MAKING USE OF HIGH THROUGHPUT TECHNOLOGIES AS DESCRIBED BY CMS-2020-01-R: HCPCS

## 2020-08-13 LAB — SARS-COV-2: NOT DETECTED

## 2020-08-17 ENCOUNTER — ANESTHESIA EVENT (OUTPATIENT)
Dept: OPERATING ROOM | Age: 85
End: 2020-08-17
Payer: MEDICARE

## 2020-08-17 ENCOUNTER — ANESTHESIA (OUTPATIENT)
Dept: OPERATING ROOM | Age: 85
End: 2020-08-17
Payer: MEDICARE

## 2020-08-17 ENCOUNTER — HOSPITAL ENCOUNTER (OUTPATIENT)
Age: 85
Setting detail: OUTPATIENT SURGERY
Discharge: HOME | End: 2020-08-17
Attending: PAIN MEDICINE | Admitting: PAIN MEDICINE
Payer: MEDICARE

## 2020-08-17 ENCOUNTER — APPOINTMENT (OUTPATIENT)
Dept: GENERAL RADIOLOGY | Age: 85
End: 2020-08-17
Attending: PAIN MEDICINE
Payer: MEDICARE

## 2020-08-17 VITALS
SYSTOLIC BLOOD PRESSURE: 136 MMHG | HEIGHT: 71 IN | DIASTOLIC BLOOD PRESSURE: 73 MMHG | HEART RATE: 81 BPM | BODY MASS INDEX: 25.48 KG/M2 | OXYGEN SATURATION: 96 % | RESPIRATION RATE: 16 BRPM | WEIGHT: 182 LBS | TEMPERATURE: 97.3 F

## 2020-08-17 VITALS
SYSTOLIC BLOOD PRESSURE: 153 MMHG | DIASTOLIC BLOOD PRESSURE: 69 MMHG | RESPIRATION RATE: 14 BRPM | OXYGEN SATURATION: 99 %

## 2020-08-17 PROCEDURE — 3700000000 HC ANESTHESIA ATTENDED CARE: Performed by: PAIN MEDICINE

## 2020-08-17 PROCEDURE — 6360000002 HC RX W HCPCS: Performed by: PAIN MEDICINE

## 2020-08-17 PROCEDURE — 2500000003 HC RX 250 WO HCPCS: Performed by: PAIN MEDICINE

## 2020-08-17 PROCEDURE — 3600000056 HC PAIN LEVEL 4 BASE: Performed by: PAIN MEDICINE

## 2020-08-17 PROCEDURE — 64635 DESTROY LUMB/SAC FACET JNT: CPT | Performed by: PAIN MEDICINE

## 2020-08-17 PROCEDURE — 3209999900 FLUORO FOR SURGICAL PROCEDURES

## 2020-08-17 PROCEDURE — 64636 DESTROY L/S FACET JNT ADDL: CPT | Performed by: PAIN MEDICINE

## 2020-08-17 PROCEDURE — 2709999900 HC NON-CHARGEABLE SUPPLY: Performed by: PAIN MEDICINE

## 2020-08-17 PROCEDURE — 3600000057 HC PAIN LEVEL 4 ADDL 15 MIN: Performed by: PAIN MEDICINE

## 2020-08-17 PROCEDURE — 3700000001 HC ADD 15 MINUTES (ANESTHESIA): Performed by: PAIN MEDICINE

## 2020-08-17 PROCEDURE — 2720000010 HC SURG SUPPLY STERILE: Performed by: PAIN MEDICINE

## 2020-08-17 PROCEDURE — 7100000010 HC PHASE II RECOVERY - FIRST 15 MIN: Performed by: PAIN MEDICINE

## 2020-08-17 PROCEDURE — 6360000002 HC RX W HCPCS: Performed by: NURSE ANESTHETIST, CERTIFIED REGISTERED

## 2020-08-17 PROCEDURE — 7100000011 HC PHASE II RECOVERY - ADDTL 15 MIN: Performed by: PAIN MEDICINE

## 2020-08-17 RX ORDER — PROPOFOL 10 MG/ML
INJECTION, EMULSION INTRAVENOUS PRN
Status: DISCONTINUED | OUTPATIENT
Start: 2020-08-17 | End: 2020-08-17 | Stop reason: SDUPTHER

## 2020-08-17 RX ORDER — BUPIVACAINE HYDROCHLORIDE 2.5 MG/ML
INJECTION, SOLUTION EPIDURAL; INFILTRATION; INTRACAUDAL PRN
Status: DISCONTINUED | OUTPATIENT
Start: 2020-08-17 | End: 2020-08-17 | Stop reason: ALTCHOICE

## 2020-08-17 RX ORDER — METHYLPREDNISOLONE ACETATE 80 MG/ML
INJECTION, SUSPENSION INTRA-ARTICULAR; INTRALESIONAL; INTRAMUSCULAR; SOFT TISSUE PRN
Status: DISCONTINUED | OUTPATIENT
Start: 2020-08-17 | End: 2020-08-17 | Stop reason: ALTCHOICE

## 2020-08-17 RX ORDER — FENTANYL CITRATE 50 UG/ML
INJECTION, SOLUTION INTRAMUSCULAR; INTRAVENOUS PRN
Status: DISCONTINUED | OUTPATIENT
Start: 2020-08-17 | End: 2020-08-17 | Stop reason: SDUPTHER

## 2020-08-17 RX ORDER — LIDOCAINE HYDROCHLORIDE 10 MG/ML
INJECTION, SOLUTION EPIDURAL; INFILTRATION; INTRACAUDAL; PERINEURAL PRN
Status: DISCONTINUED | OUTPATIENT
Start: 2020-08-17 | End: 2020-08-17 | Stop reason: ALTCHOICE

## 2020-08-17 RX ADMIN — PROPOFOL 30 MG: 10 INJECTION, EMULSION INTRAVENOUS at 14:10

## 2020-08-17 RX ADMIN — FENTANYL CITRATE 50 MCG: 50 INJECTION, SOLUTION INTRAMUSCULAR; INTRAVENOUS at 14:05

## 2020-08-17 ASSESSMENT — PULMONARY FUNCTION TESTS
PIF_VALUE: 0

## 2020-08-17 ASSESSMENT — PAIN SCALES - GENERAL: PAINLEVEL_OUTOF10: 0

## 2020-08-17 ASSESSMENT — PAIN - FUNCTIONAL ASSESSMENT: PAIN_FUNCTIONAL_ASSESSMENT: 0-10

## 2020-08-17 ASSESSMENT — PAIN DESCRIPTION - DESCRIPTORS: DESCRIPTORS: CONSTANT

## 2020-08-17 NOTE — ANESTHESIA POSTPROCEDURE EVALUATION
Department of Anesthesiology  Postprocedure Note    Patient: Luisana Chicas  MRN: [id number]  YOB: 1927  Date of evaluation: 8/17/2020  Time:  3:24 PM     Procedure Summary     Date:  08/17/20 Room / Location:  [address] [place] [address] 03 / [facility]    Anesthesia Start:  1401 Anesthesia Stop:  7997    Procedure:  Right L-facet RFA @ L3-4, L4-5, and L5-S1 (Right ) Diagnosis:  (LUMBAR SPONDYLOSIS)    Surgeon:  Franco Berg MD Responsible Provider:  Kaylah Galeano MD    Anesthesia Type:  MAC ASA Status:  4          Anesthesia Type: MAC    Thierno Phase I:      Thierno Phase II: Thierno Score: 10    Last vitals: Reviewed and per EMR flowsheets.        Anesthesia Post Evaluation

## 2020-08-17 NOTE — H&P
H&P    Left L-facet RFA from 7/20/2020. Receiving 75% relief of left lower back pain from Left L-facet RFA. \"My quality of life has improved, I can go longer and have more flexibility\". I was able to tend myy garden\". Main complaint is right lower back pain- aching         Medications reviewed. Patient denies side effects with medications. Patient states he is taking medications as prescribed. Hedenies receiving pain medications from other sources. He denies any ER visits since last visit.     Pain scale with out pain medications or at its worst is 2-8/10.           The patientis allergic to morphine.     Past Medical History  Betty Catherine  has a past medical history of Arthritis, CAD (coronary artery disease), Cancer ([address] Hypertension, and Irregular heart beat.     Past Surgical History  The patient  has a past surgical history that includes hernia repair; joint replacement (Left); joint replacement (Left); Knee arthroscopy (Left); Colonoscopy; pr inj dx/ther agnt paravert facet joint, cerv/thorac, 2nd level (Bilateral, 8/23/2018); pr inj dx/ther agnt paravert facet joint, cerv/thorac, 2nd level (Bilateral, 10/22/2018); and Pain management procedure (Left, 7/20/2020).    Family History  This patient's family history is not on file.     Social History  Betty Catherine  reports that he has never smoked.  He has never used smokeless tobacco. He reports that he does not drink alcohol or use drugs.     Medications  Current Medication     Current Outpatient Medications:     isosorbide mononitrate (IMDUR) 30 MG extended release tablet, Take 1 tablet by mouth daily, Disp: 30 tablet, Rfl: 0    amiodarone (CORDARONE) 200 MG tablet, Take 1 tablet by mouth daily, Disp: 30 tablet, Rfl: 0    apixaban (ELIQUIS) 5 MG TABS tablet, Take 1 tablet by mouth 2 times daily, Disp: 60 tablet, Rfl: 0    metoprolol succinate (TOPROL XL) 25 MG extended release tablet, Take 1 tablet by mouth daily, Disp: 30 tablet, Rfl: 0    clopidogrel (PLAVIX) 75 MG tablet, Take 1 tablet by mouth daily, Disp: 30 tablet, Rfl: 0    nitroGLYCERIN (NITROSTAT) 0.4 MG SL tablet, up to max of 3 total doses. If no relief after 1 dose, call 911., Disp: 25 tablet, Rfl: 0    MISC NATURAL PRODUCTS PO, Take 1 tablet by mouth daily, Disp: , Rfl:     Omega-3 Fatty Acids (OMEGA-3 PLUS PO), Take 1 tablet by mouth daily, Disp: , Rfl:     MELATONIN PO, Take 1 tablet by mouth nightly, Disp: , Rfl:     NONFORMULARY, , Disp: , Rfl:     Multiple Vitamins-Minerals (MULTIVITAMIN ADULT PO), Take by mouth, Disp: , Rfl:     Mirabegron ER (MYRBETRIQ) 25 MG TB24, Myrbetriq 25 mg tablet,extended release daily, Disp: , Rfl:     atorvastatin (LIPITOR) 40 MG tablet, atorvastatin 40 mg tablet, Disp: , Rfl:     bicalutamide (CASODEX) 50 MG chemo tablet, Take 50 mg by mouth, Disp: , Rfl:     lisinopril (PRINIVIL;ZESTRIL) 10 MG tablet, lisinopril 10 mg tablet   1 tablet every day by oral route., Disp: , Rfl:     aspirin 81 MG chewable tablet, Take 1 tablet by mouth daily for 10 days, Disp: 10 tablet, Rfl: 0        Subjective:      Review of Systems   Constitutional: Negative. HENT: Negative. Musculoskeletal: Positive for arthralgias, back pain, gait problem and myalgias. Skin: Negative. Neurological: Positive for weakness. Psychiatric/Behavioral: Negative.          Objective:      Vitals       Vitals:     08/03/20 0921   BP: 118/66   Temp: 97.3 °F (36.3 °C)   Weight: 182 lb (82.6 kg)   Height: 5' 11\" (1.803 m)           Physical Exam  Constitutional:       General: He is not in acute distress. Appearance: He is well-developed. He is not diaphoretic. HENT:      Head: Normocephalic and atraumatic. Right Ear: External ear normal.      Left Ear: External ear normal.      Nose: Nose normal.      Mouth/Throat:      Pharynx: No oropharyngeal exudate. Eyes:      General: No scleral icterus. Right eye: No discharge. Left eye: No discharge.       Conjunctiva/sclera: Conjunctivae normal.      Pupils: Pupils are equal, round, and reactive to light. Neck:      Musculoskeletal: Normal range of motion and neck supple. Thyroid: No thyromegaly. Vascular: No JVD. Trachea: No tracheal deviation. Cardiovascular:      Rate and Rhythm: Normal rate and regular rhythm. Heart sounds: Normal heart sounds. No murmur. No friction rub. No gallop. Pulmonary:      Effort: Pulmonary effort is normal. No respiratory distress. Breath sounds: Normal breath sounds. No stridor. No wheezing or rales. Abdominal:      General: Bowel sounds are normal. There is no distension. Palpations: Abdomen is soft. Tenderness: There is no abdominal tenderness. There is no guarding or rebound. Musculoskeletal:         General: Tenderness present. Right shoulder: He exhibits normal range of motion. Left shoulder: He exhibits decreased range of motion. Right elbow: He exhibits normal range of motion. Left elbow: He exhibits normal range of motion. Right hip: He exhibits normal range of motion. Left hip: He exhibits normal range of motion. Right knee: No tenderness found. Left knee: Tenderness found. Right ankle: No tenderness. Left ankle: No tenderness. Cervical back: He exhibits normal range of motion. Thoracic back: He exhibits normal range of motion. Lumbar back: He exhibits decreased range of motion, tenderness, bony tenderness and pain. Back:       Right upper arm: He exhibits no tenderness. Left upper arm: He exhibits no tenderness. Right forearm: He exhibits no tenderness. Left forearm: He exhibits no tenderness. Right upper leg: He exhibits no tenderness. Left upper leg: He exhibits no tenderness. Right lower leg: He exhibits no tenderness. Left lower leg: He exhibits no tenderness. Right foot: Normal range of motion. Left foot: Normal range of motion. Lymphadenopathy:      Cervical: No cervical adenopathy. Skin:     General: Skin is warm and dry. Neurological:      Mental Status: He is alert and oriented to person, place, and time. Cranial Nerves: No cranial nerve deficit. Sensory: No sensory deficit. Motor: No abnormal muscle tone. Coordination: Coordination normal.      Deep Tendon Reflexes: Reflexes are normal and symmetric. Reflexes normal.      Reflex Scores:       Tricep reflexes are 2+ on the right side and 2+ on the left side. Bicep reflexes are 2+ on the right side and 2+ on the left side. Brachioradialis reflexes are 2+ on the right side and 2+ on the left side. Patellar reflexes are 2+ on the right side and 2+ on the left side. Achilles reflexes are 2+ on the right side and 2+ on the left side. Comments: SLR negative 90 degrees       EFRA test: negative   Yeomans test: negative   Gaenslen test: negative      Assessment:      1. Spondylosis of lumbar region without myelopathy or radiculopathy    2. Lumbar facet arthropathy    3. Chronic bilateral low back pain without sciatica    4. Lumbar degenerative disc disease    5. Spinal stenosis of lumbar region without neurogenic claudication    6. Chronic pain syndrome            Plan:      · OARRS reviewed. Current MED: 0  · Patient was not offered naloxone for home. · Discussed long term side effects of medications, tolerance, dependency and addiction. · Previous UDS reviewed  · UDS preformed today for compliance. · Patient told can not receive any pain medications from any other source. · No evidence of abuse, diversion or aberrant behavior. · Medications and/or procedures to improve function and quality of life- patient understanding with this and that may not be pain free  · Discussed with patient about safe storage of medications at home  · Discussed possible weaning of medication dosing dependent on treatment/procedure results.    · Discussed

## 2020-08-17 NOTE — H&P
[facility]  History and Physical Update    Pt Name: Jessica Mackay  MRN: [id number]  YOB: 1927  Date of evaluation: 8/17/2020      I have examined the patient and reviewed the H&P/Consult and there are no changes to the patient or plans.         Electronically signed by Tiffanie Chun MD on 8/17/2020 at 12:37 PM

## 2020-08-17 NOTE — ANESTHESIA PRE PROCEDURE
lisinopril 10 mg tablet    1 tablet every day by oral route. Historical Provider, MD       Current medications:    No current facility-administered medications for this encounter. Allergies:     Allergies   Allergen Reactions    Morphine Itching       Problem List:    Patient Active Problem List   Diagnosis Code    Lumbar spondylosis M47.816    Cardiac arrest ([facility] I46.9    Chest pain R07.9    Hyperlipidemia E78.5    CAD (coronary artery disease) I25.10    Leukocytosis D72.829    S/P angioplasty with stent Z95.820    Ventricular tachycardia (HCC) I47.2    PA[facility]oxysmal [facility] fibrillation) ([facility] I48.0    Hyponatremia I11.7    Metabolic acidosis V73.5    Elevated LFTs R94.5    Right shoulder pain M25.511    Chronic anticoagulation Z79.01    Normocytic anemia D64.9    Essential hypertension I10    Moderate aortic stenosis I35.0    Prostate cancer ([facility]) C61    Chronic back pain greater than 3 months duration M54.9, G89.29    Physical deconditioning R53.81       Past Medical History:        Diagnosis Date    Arthritis     CAD (coronary artery disease)     Cancer ([facility]     PROSTATE    Hypertension     Irregular heart beat        Past Surgical History:        Procedure Laterality Date    COLONOSCOPY      HERNIA REPAIR      JOINT REPLACEMENT Left     HIP    JOINT REPLACEMENT Left     SHOULDER    KNEE ARTHROSCOPY Left     PAIN MANAGEMENT PROCEDURE Left 7/20/2020    Bilateral L-facet RFA @ L3-4, L4-5, and L5-S1 LEFT SIDE FIRST performed by Tiffanie Chun MD at [address] [place] [address] DX/THER AGNT PARAVERT FACET JOINT, CERV/THORAC, 2ND LEVEL Bilateral 8/23/2018    LUMBAR FACET Bilateral L-facet MBB @ L3-4, L4-5, L5-S1. performed by Tiffanie Chun MD at [address] [place] [address] DX/THER AGNT PARAVERT FACET JOINT, CERV/THORAC, 2ND LEVEL Bilateral 10/22/2018    Lumbar Facet [email or web address] Bilateral performed by Tiffanie Chun MD at [facility] OR       Social History:    Social History     Tobacco Use    Smoking status: Never Smoker    Smokeless tobacco: Never Used   Substance Use Topics    Alcohol use: No                                Counseling given: Not Answered      Vital Signs (Current): There were no vitals filed for this visit. BP Readings from Last 3 Encounters:   08/03/20 118/66   07/20/20 (!) 91/52   07/20/20 (!) 185/91       NPO Status:                                                                                 BMI:   Wt Readings from Last 3 Encounters:   08/03/20 182 lb (82.6 kg)   07/20/20 182 lb 12.8 oz (82.9 kg)   05/06/19 198 lb 6.6 oz (90 kg)     There is no height or weight on file to calculate BMI.    CBC:   Lab Results   Component Value Date    WBC 9.1 12/01/2018    RBC 3.21 12/01/2018    HGB 11.3 12/02/2018    HCT 33.1 12/02/2018    MCV 93.5 12/01/2018     12/01/2018       CMP:   Lab Results   Component Value Date     12/02/2018    K 4.4 12/02/2018    K 4.3 11/29/2018     12/02/2018    CO2 23 12/02/2018    BUN 13 12/02/2018    CREATININE 1.1 12/02/2018    LABGLOM 63 12/02/2018    GLUCOSE 105 12/02/2018    PROT 6.3 12/02/2018    CALCIUM 9.5 12/02/2018    BILITOT 0.9 12/02/2018    ALKPHOS 67 12/02/2018    AST 46 12/02/2018    ALT 69 12/02/2018       POC Tests: No results for input(s): POCGLU, POCNA, POCK, POCCL, POCBUN, POCHEMO, POCHCT in the last 72 hours.     Coags:   Lab Results   Component Value Date    APTT 171.2 11/29/2018       HCG (If Applicable): No results found for: PREGTESTUR, PREGSERUM, HCG, HCGQUANT     ABGs: No results found for: PHART, PO2ART, EHY6LSC, JCD2DUU, BEART, I1OPPULZ     Type & Screen (If Applicable):  No results found for: LABABO, LABRH    Drug/Infectious Status (If Applicable):  No results found for: HIV, HEPCAB    COVID-19 Screening (If Applicable):   Lab Results   Component Value Date    COVID19 Not Detected 08/11/2020 Anesthesia Evaluation    Airway: Mallampati: II       Mouth opening: > = 3 FB Dental:          Pulmonary:                              Cardiovascular:    (+) hypertension:, CAD:, dysrhythmias:,                   Neuro/Psych:               GI/Hepatic/Renal:             Endo/Other:                     Abdominal:           Vascular:                                        Anesthesia Plan      MAC     ASA 4             Anesthetic plan and risks discussed with patient. Plan discussed with CRNA.                   Navid Pryor MD   8/17/2020

## 2020-08-17 NOTE — OP NOTE
Operative Note  Pre-Procedure Note    Patient Name: Caretha Lefort   YOB: 1927  Medical Record Number: [id number]  Date: 8/17/20    Indication:  Lower back pain  Consent: On file. Vital Signs:   Vitals:    08/17/20 1306   BP: (!) 149/91   Pulse: 92   Resp: 16   Temp: 97.5 °F (36.4 °C)   SpO2: 97%       Past Medical History:   has a past medical history of Arthritis, CAD (coronary artery disease), Cancer ([address] Hypertension, and Irregular heart beat. Past Surgical History:   has a past surgical history that includes hernia repair; joint replacement (Left); joint replacement (Left); Knee arthroscopy (Left); Colonoscopy; pr inj dx/ther agnt paravert facet joint, cerv/thorac, 2nd level (Bilateral, 8/23/2018); pr inj dx/ther agnt paravert facet joint, cerv/thorac, 2nd level (Bilateral, 10/22/2018); and Pain management procedure (Left, 7/20/2020). Pre-Sedation Documentation and Exam:   Vital signs have been reviewed (see flow sheet for vitals). Sedation/ Anesthesia Plan:   MAC    Patient is an appropriate candidate for plan of sedation: yes         Preoperative Diagnosis: L-spondylosis     Post-Op Dx: as above     Procedure Performed:  :Radiofrequency ablation of median branches at the levels of L3-4,L4-5 and L5-S1 right under fluoroscopic guidance      Indication for the Procedure: The patient has ahistory of chronic low back pain that is not responding well to the conservative treatment. Patient's pain is mostly axial in nature. Pain is interfering with the activities of daily living. Physical examination revealed facet tenderness and facet loading is positive. Patient had undergone lumbar facet joint injections with pain relief that lasted for only a short period of time and had greater than 70% pain relief with confirmatory median branch blocks. Hence we decided to do radiofrequency abalation of median branches for long term pain releif.    The procedure and risks  were discussed with signs and neurological status remained stable throughout the procedure and post procedural period.   The patient tolerated the procedure well and was discharged home in stable condition.       Electronically signed by Dyana Nagel MD on 8/17/20 at 1:58 PM EDT

## 2020-09-08 ENCOUNTER — OFFICE VISIT (OUTPATIENT)
Dept: PHYSICAL MEDICINE AND REHAB | Age: 85
End: 2020-09-08
Payer: MEDICARE

## 2020-09-08 VITALS
HEART RATE: 70 BPM | SYSTOLIC BLOOD PRESSURE: 128 MMHG | HEIGHT: 71 IN | BODY MASS INDEX: 25.48 KG/M2 | WEIGHT: 182 LBS | TEMPERATURE: 97.1 F | DIASTOLIC BLOOD PRESSURE: 64 MMHG

## 2020-09-08 PROCEDURE — 1123F ACP DISCUSS/DSCN MKR DOCD: CPT | Performed by: NURSE PRACTITIONER

## 2020-09-08 PROCEDURE — 1036F TOBACCO NON-USER: CPT | Performed by: NURSE PRACTITIONER

## 2020-09-08 PROCEDURE — G8427 DOCREV CUR MEDS BY ELIG CLIN: HCPCS | Performed by: NURSE PRACTITIONER

## 2020-09-08 PROCEDURE — G8417 CALC BMI ABV UP PARAM F/U: HCPCS | Performed by: NURSE PRACTITIONER

## 2020-09-08 PROCEDURE — 4040F PNEUMOC VAC/ADMIN/RCVD: CPT | Performed by: NURSE PRACTITIONER

## 2020-09-08 PROCEDURE — 99213 OFFICE O/P EST LOW 20 MIN: CPT | Performed by: NURSE PRACTITIONER

## 2020-09-08 ASSESSMENT — ENCOUNTER SYMPTOMS: BACK PAIN: 1

## 2020-09-08 NOTE — PROGRESS NOTES
[facility]  [address]  Dept: [phone]  Dept Fax: [phone]: [phone]    Visit Date: 9/8/2020    Functionality Assessment/Goals Worksheet     On a scale of 0 (Does not Interfere) to 10 (Completely Interferes)     1. Which number describes how during the past week pain has interfered with       the following:  A. General Activity:  7  B. Mood: 9  C. Walking Ability:  2  D. Normal Work (Includes both work outside the home and housework):  4  E. Relations with Other People:   0  F. Sleep:   3  G. Enjoyment of Life:   3    2. Patient Prefers to Take their Pain Medications:     [x]  On a regular basis   []  Only when necessary    []  Does not take pain medications    3. What are the Patient's Goals/Expectations for Visiting Pain Management? []  Learn about my pain    [x]  Receive Medication   [x]  Physical Therapy     []  Treat Depression   []  Receive Injections    []  Treat Sleep   []  Deal with Anxiety and Stress   []  Treat Opoid Dependence/Addiction   []  Other:      HPI:   Sonya Tomlin is a 80 y.o. male is here today for    Chief Complaint: Lower back pain     HPI   Fu from bilateral L-fact RFA, left side from 7/20/2020 and right side from 8/17/2020. Receiving about 85% relief of pain across lower back. \"its a big improvement, I have more longevity, I have been working out on exercise bike\". \"I have quality of life to the max\". Pain is tolerable. Just some mild aching pain across lower back. Able to get around       Medications reviewed. Patient denies side effects with medications. Patient states he is taking medications as prescribed. Hedenies receiving pain medications from other sources. He denies any ER visits since last visit. Pain scale with out pain medications or at its worst is 1-2/10. The patientis allergic to morphine.     Past Medical History  Kayla Gipson  has a past medical history of Arthritis, CAD (coronary artery disease), Cancer ([address] Hypertension, and Irregular heart beat. Past Surgical History  The patient  has a past surgical history that includes hernia repair; joint replacement (Left); joint replacement (Left); Knee arthroscopy (Left); Colonoscopy; pr inj dx/ther agnt paravert facet joint, cerv/thorac, 2nd level (Bilateral, 8/23/2018); pr inj dx/ther agnt paravert facet joint, cerv/thorac, 2nd level (Bilateral, 10/22/2018); Pain management procedure (Left, 7/20/2020); and Pain management procedure (Right, 8/17/2020). Family History  This patient's family history is not on file. Social History  Kayla Gipson  reports that he has never smoked. He has never used smokeless tobacco. He reports that he does not drink alcohol or use drugs. Medications    Current Outpatient Medications:     isosorbide mononitrate (IMDUR) 30 MG extended release tablet, Take 1 tablet by mouth daily, Disp: 30 tablet, Rfl: 0    amiodarone (CORDARONE) 200 MG tablet, Take 1 tablet by mouth daily, Disp: 30 tablet, Rfl: 0    apixaban (ELIQUIS) 5 MG TABS tablet, Take 1 tablet by mouth 2 times daily, Disp: 60 tablet, Rfl: 0    metoprolol succinate (TOPROL XL) 25 MG extended release tablet, Take 1 tablet by mouth daily, Disp: 30 tablet, Rfl: 0    clopidogrel (PLAVIX) 75 MG tablet, Take 1 tablet by mouth daily, Disp: 30 tablet, Rfl: 0    nitroGLYCERIN (NITROSTAT) 0.4 MG SL tablet, up to max of 3 total doses.  If no relief after 1 dose, call 911., Disp: 25 tablet, Rfl: 0    MISC NATURAL PRODUCTS PO, Take 1 tablet by mouth daily, Disp: , Rfl:     Omega-3 Fatty Acids (OMEGA-3 PLUS PO), Take 1 tablet by mouth daily, Disp: , Rfl:     MELATONIN PO, Take 1 tablet by mouth nightly, Disp: , Rfl:     NONFORMULARY, , Disp: , Rfl:     Multiple Vitamins-Minerals (MULTIVITAMIN ADULT PO), Take by mouth, Disp: , Rfl:     Mirabegron ER (MYRBETRIQ) 25 MG TB24, Myrbetriq 25 mg tablet,extended release daily, Disp: , Rfl:     atorvastatin (LIPITOR) 40 MG tablet, atorvastatin 40 mg tablet, Disp: , Rfl:     bicalutamide (CASODEX) 50 MG chemo tablet, Take 50 mg by mouth, Disp: , Rfl:     lisinopril (PRINIVIL;ZESTRIL) 10 MG tablet, lisinopril 10 mg tablet   1 tablet every day by oral route., Disp: , Rfl:     Subjective:      Review of Systems   Constitutional: Negative. HENT: Negative. Musculoskeletal: Positive for arthralgias, back pain, gait problem and myalgias. Skin: Negative. Neurological: Positive for weakness. Psychiatric/Behavioral: Negative. Objective:     Vitals:    09/08/20 0944   BP: 128/64   Site: Left Upper Arm   Position: Sitting   Cuff Size: Medium Adult   Pulse: 70   Temp: 97.1 °F (36.2 °C)   TempSrc: Temporal   Weight: 182 lb (82.6 kg)   Height: 5' 11\" (1.803 m)       Physical Exam  Constitutional:       General: He is not in acute distress. Appearance: He is well-developed. He is not diaphoretic. HENT:      Head: Normocephalic and atraumatic. Right Ear: External ear normal.      Left Ear: External ear normal.      Nose: Nose normal.      Mouth/Throat:      Pharynx: No oropharyngeal exudate. Eyes:      General: No scleral icterus. Right eye: No discharge. Left eye: No discharge. Conjunctiva/sclera: Conjunctivae normal.      Pupils: Pupils are equal, round, and reactive to light. Neck:      Musculoskeletal: Normal range of motion and neck supple. Thyroid: No thyromegaly. Vascular: No JVD. Trachea: No tracheal deviation. Cardiovascular:      Rate and Rhythm: Normal rate and regular rhythm. Heart sounds: Normal heart sounds. No murmur. No friction rub. No gallop. Pulmonary:      Effort: Pulmonary effort is normal. No respiratory distress. Breath sounds: Normal breath sounds. No stridor. No wheezing or rales. Abdominal:      General: Bowel sounds are normal. There is no distension. Palpations: Abdomen is soft. Tenderness: There is no abdominal tenderness. There is no guarding or rebound. Musculoskeletal:         General: Tenderness present. Right shoulder: He exhibits normal range of motion. Left shoulder: He exhibits decreased range of motion. Right elbow: He exhibits normal range of motion. Left elbow: He exhibits normal range of motion. Right hip: He exhibits normal range of motion. Left hip: He exhibits normal range of motion. Right knee: No tenderness found. Left knee: Tenderness found. Right ankle: No tenderness. Left ankle: No tenderness. Cervical back: He exhibits normal range of motion. Thoracic back: He exhibits normal range of motion. Lumbar back: He exhibits decreased range of motion, tenderness, bony tenderness and pain. Back:       Right upper arm: He exhibits no tenderness. Left upper arm: He exhibits no tenderness. Right forearm: He exhibits no tenderness. Left forearm: He exhibits no tenderness. Right upper leg: He exhibits no tenderness. Left upper leg: He exhibits no tenderness. Right lower leg: He exhibits no tenderness. Left lower leg: He exhibits no tenderness. Right foot: Normal range of motion. Left foot: Normal range of motion. Lymphadenopathy:      Cervical: No cervical adenopathy. Skin:     General: Skin is warm and dry. Neurological:      Mental Status: He is alert and oriented to person, place, and time. Cranial Nerves: No cranial nerve deficit. Sensory: No sensory deficit. Motor: No abnormal muscle tone. Coordination: Coordination normal.      Deep Tendon Reflexes: Reflexes are normal and symmetric. Reflexes normal.      Reflex Scores:       Tricep reflexes are 2+ on the right side and 2+ on the left side. Bicep reflexes are 2+ on the right side and 2+ on the left side.        Brachioradialis reflexes are 2+ on the right side and 2+ on the left side. Patellar reflexes are 2+ on the right side and 2+ on the left side. Achilles reflexes are 2+ on the right side and 2+ on the left side. Comments: SLR negative 90 degrees       EFRA test: negative   Yeomans test: negative   Gaenslen test: negative      Assessment:     1. Spondylosis of lumbar region without myelopathy or radiculopathy    2. Lumbar facet arthropathy    3. Lumbar degenerative disc disease    4. Chronic bilateral low back pain without sciatica    5. Spinal stenosis of lumbar region without neurogenic claudication    6. Chronic pain syndrome            Plan:      · OARRS reviewed. Current MED: 0  · Patient was not offered naloxone for home. · Discussed long term side effects of medications, tolerance, dependency and addiction. · Previous UDS reviewed  · UDS preformed today for compliance. · Patient told can not receive any pain medications from any other source. · No evidence of abuse, diversion or aberrant behavior.  Medications and/or procedures to improve function and quality of life- patient understanding with this and that may not be pain free   Discussed with patient about safe storage of medications at home   Discussed possible weaning of medication dosing dependent on treatment/procedure results.  Discussed with patient about risks with procedure including infection, reaction to medication, increased pain, or bleeding.  Procedure notes reveiwed in detail.  Receiving 85% relief of lower back pain from bilateral L-facet RFA   Pain is tolerable at this time. . Not needing any medications. Meds. Prescribed:   No orders of the defined types were placed in this encounter. Return in about 6 months (around 3/8/2021), or if symptoms worsen or fail to improve, for follow up  for medications.        Electronically signed by KAREEM Call CNP on9/8/2020 at 10:13 AM

## (undated) DEVICE — HYPODERMIC SAFETY NEEDLE: Brand: MAGELLAN

## (undated) DEVICE — ZINACTIVE USE 2537982 PAD GRND FOR RF PAIN MGMT DISP

## (undated) DEVICE — Device

## (undated) DEVICE — SHEET,DRAPE,3/4,53X77,STERILE: Brand: MEDLINE

## (undated) DEVICE — SYRINGE MED 10ML LUERLOCK TIP W/O SFTY DISP

## (undated) DEVICE — GAUZE,SPONGE,4"X4",12PLY,STERILE,LF,2'S: Brand: MEDLINE

## (undated) DEVICE — GLOVE SURG SZ 65 THK91MIL LTX FREE SYN POLYISOPRENE

## (undated) DEVICE — TOWEL,OR,DSP,ST,BLUE,STD,4/PK,20PK/CS: Brand: MEDLINE

## (undated) DEVICE — NEEDLE SPNL 22GA L3.5IN BLK HUB S STL REG WALL FIT STYL W/

## (undated) DEVICE — NEEDLE SPNL 22 GAX5 IN HUB QNCKE FUNNEL SHP STYL BLK SPINCAN

## (undated) DEVICE — GLOVE ORANGE PI 7 1/2   MSG9075

## (undated) DEVICE — SYRINGE MED 5ML STD CLR PLAS LUERLOCK TIP N CTRL DISP

## (undated) DEVICE — NEEDLE SYR 18GA L1.5IN RED PLAS HUB S STL BLNT FILL W/O

## (undated) DEVICE — CHLORAPREP 26ML CLEAR